# Patient Record
Sex: MALE | Race: WHITE | NOT HISPANIC OR LATINO
[De-identification: names, ages, dates, MRNs, and addresses within clinical notes are randomized per-mention and may not be internally consistent; named-entity substitution may affect disease eponyms.]

---

## 2017-03-28 ENCOUNTER — APPOINTMENT (OUTPATIENT)
Dept: DERMATOLOGY | Facility: CLINIC | Age: 57
End: 2017-03-28

## 2017-04-04 ENCOUNTER — APPOINTMENT (OUTPATIENT)
Dept: DERMATOLOGY | Facility: CLINIC | Age: 57
End: 2017-04-04

## 2018-04-03 ENCOUNTER — APPOINTMENT (OUTPATIENT)
Dept: CARDIOLOGY | Facility: CLINIC | Age: 58
End: 2018-04-03
Payer: MEDICARE

## 2018-04-03 ENCOUNTER — NON-APPOINTMENT (OUTPATIENT)
Age: 58
End: 2018-04-03

## 2018-04-03 VITALS
HEART RATE: 98 BPM | BODY MASS INDEX: 32.2 KG/M2 | SYSTOLIC BLOOD PRESSURE: 140 MMHG | WEIGHT: 230 LBS | HEIGHT: 71 IN | DIASTOLIC BLOOD PRESSURE: 64 MMHG

## 2018-04-03 DIAGNOSIS — Z00.00 ENCOUNTER FOR GENERAL ADULT MEDICAL EXAMINATION W/OUT ABNORMAL FINDINGS: ICD-10-CM

## 2018-04-03 DIAGNOSIS — M19.90 UNSPECIFIED OSTEOARTHRITIS, UNSPECIFIED SITE: ICD-10-CM

## 2018-04-03 PROCEDURE — 93000 ELECTROCARDIOGRAM COMPLETE: CPT

## 2018-04-03 PROCEDURE — 99205 OFFICE O/P NEW HI 60 MIN: CPT

## 2018-04-16 ENCOUNTER — APPOINTMENT (OUTPATIENT)
Dept: CARDIOLOGY | Facility: CLINIC | Age: 58
End: 2018-04-16
Payer: MEDICARE

## 2018-04-16 PROCEDURE — 93306 TTE W/DOPPLER COMPLETE: CPT

## 2018-04-19 ENCOUNTER — NON-APPOINTMENT (OUTPATIENT)
Age: 58
End: 2018-04-19

## 2018-04-19 ENCOUNTER — APPOINTMENT (OUTPATIENT)
Dept: CARDIOLOGY | Facility: CLINIC | Age: 58
End: 2018-04-19
Payer: MEDICARE

## 2018-04-19 VITALS — SYSTOLIC BLOOD PRESSURE: 128 MMHG | OXYGEN SATURATION: 98 % | HEART RATE: 74 BPM | DIASTOLIC BLOOD PRESSURE: 94 MMHG

## 2018-04-19 VITALS — DIASTOLIC BLOOD PRESSURE: 100 MMHG | SYSTOLIC BLOOD PRESSURE: 130 MMHG

## 2018-04-19 DIAGNOSIS — N20.0 CALCULUS OF KIDNEY: ICD-10-CM

## 2018-04-19 DIAGNOSIS — Z87.828 PERSONAL HISTORY OF OTHER (HEALED) PHYSICAL INJURY AND TRAUMA: ICD-10-CM

## 2018-04-19 DIAGNOSIS — Z78.9 OTHER SPECIFIED HEALTH STATUS: ICD-10-CM

## 2018-04-19 DIAGNOSIS — Z86.79 PERSONAL HISTORY OF OTHER DISEASES OF THE CIRCULATORY SYSTEM: ICD-10-CM

## 2018-04-19 DIAGNOSIS — Z56.0 UNEMPLOYMENT, UNSPECIFIED: ICD-10-CM

## 2018-04-19 PROCEDURE — 93000 ELECTROCARDIOGRAM COMPLETE: CPT

## 2018-04-19 PROCEDURE — 99214 OFFICE O/P EST MOD 30 MIN: CPT

## 2018-04-19 RX ORDER — METOPROLOL TARTRATE 25 MG/1
25 TABLET, FILM COATED ORAL TWICE DAILY
Refills: 0 | Status: DISCONTINUED | COMMUNITY
End: 2018-04-19

## 2018-04-19 SDOH — ECONOMIC STABILITY - INCOME SECURITY: UNEMPLOYMENT, UNSPECIFIED: Z56.0

## 2018-05-08 ENCOUNTER — OUTPATIENT (OUTPATIENT)
Dept: OUTPATIENT SERVICES | Facility: HOSPITAL | Age: 58
LOS: 1 days | End: 2018-05-08
Payer: MEDICARE

## 2018-05-08 VITALS
RESPIRATION RATE: 18 BRPM | OXYGEN SATURATION: 97 % | HEART RATE: 110 BPM | WEIGHT: 229.94 LBS | SYSTOLIC BLOOD PRESSURE: 127 MMHG | HEIGHT: 71 IN | DIASTOLIC BLOOD PRESSURE: 85 MMHG | TEMPERATURE: 98 F

## 2018-05-08 VITALS — WEIGHT: 229.94 LBS | HEIGHT: 71 IN

## 2018-05-08 DIAGNOSIS — Z90.49 ACQUIRED ABSENCE OF OTHER SPECIFIED PARTS OF DIGESTIVE TRACT: Chronic | ICD-10-CM

## 2018-05-08 DIAGNOSIS — S42.302A UNSPECIFIED FRACTURE OF SHAFT OF HUMERUS, LEFT ARM, INITIAL ENCOUNTER FOR CLOSED FRACTURE: Chronic | ICD-10-CM

## 2018-05-08 DIAGNOSIS — Z96.641 PRESENCE OF RIGHT ARTIFICIAL HIP JOINT: Chronic | ICD-10-CM

## 2018-05-08 DIAGNOSIS — Z95.828 PRESENCE OF OTHER VASCULAR IMPLANTS AND GRAFTS: Chronic | ICD-10-CM

## 2018-05-08 DIAGNOSIS — Z01.810 ENCOUNTER FOR PREPROCEDURAL CARDIOVASCULAR EXAMINATION: ICD-10-CM

## 2018-05-08 LAB
ANION GAP SERPL CALC-SCNC: 14 MMOL/L — SIGNIFICANT CHANGE UP (ref 5–17)
APTT BLD: 32.8 SEC — SIGNIFICANT CHANGE UP (ref 27.5–37.4)
BASOPHILS # BLD AUTO: 0 K/UL — SIGNIFICANT CHANGE UP (ref 0–0.2)
BASOPHILS NFR BLD AUTO: 0.6 % — SIGNIFICANT CHANGE UP (ref 0–2)
BUN SERPL-MCNC: 12 MG/DL — SIGNIFICANT CHANGE UP (ref 8–20)
CALCIUM SERPL-MCNC: 9.2 MG/DL — SIGNIFICANT CHANGE UP (ref 8.6–10.2)
CHLORIDE SERPL-SCNC: 100 MMOL/L — SIGNIFICANT CHANGE UP (ref 98–107)
CO2 SERPL-SCNC: 26 MMOL/L — SIGNIFICANT CHANGE UP (ref 22–29)
CREAT SERPL-MCNC: 0.69 MG/DL — SIGNIFICANT CHANGE UP (ref 0.5–1.3)
EOSINOPHIL # BLD AUTO: 0.1 K/UL — SIGNIFICANT CHANGE UP (ref 0–0.5)
EOSINOPHIL NFR BLD AUTO: 2.8 % — SIGNIFICANT CHANGE UP (ref 0–5)
GLUCOSE SERPL-MCNC: 88 MG/DL — SIGNIFICANT CHANGE UP (ref 70–115)
HCT VFR BLD CALC: 45.3 % — SIGNIFICANT CHANGE UP (ref 42–52)
HGB BLD-MCNC: 15.6 G/DL — SIGNIFICANT CHANGE UP (ref 14–18)
INR BLD: 1.18 RATIO — HIGH (ref 0.88–1.16)
LYMPHOCYTES # BLD AUTO: 1.1 K/UL — SIGNIFICANT CHANGE UP (ref 1–4.8)
LYMPHOCYTES # BLD AUTO: 31.7 % — SIGNIFICANT CHANGE UP (ref 20–55)
MCHC RBC-ENTMCNC: 33.6 PG — HIGH (ref 27–31)
MCHC RBC-ENTMCNC: 34.4 G/DL — SIGNIFICANT CHANGE UP (ref 32–36)
MCV RBC AUTO: 97.6 FL — HIGH (ref 80–94)
MONOCYTES # BLD AUTO: 0.4 K/UL — SIGNIFICANT CHANGE UP (ref 0–0.8)
MONOCYTES NFR BLD AUTO: 11.2 % — HIGH (ref 3–10)
NEUTROPHILS # BLD AUTO: 1.9 K/UL — SIGNIFICANT CHANGE UP (ref 1.8–8)
NEUTROPHILS NFR BLD AUTO: 53.7 % — SIGNIFICANT CHANGE UP (ref 37–73)
PLATELET # BLD AUTO: 156 K/UL — SIGNIFICANT CHANGE UP (ref 150–400)
POTASSIUM SERPL-MCNC: 4.2 MMOL/L — SIGNIFICANT CHANGE UP (ref 3.5–5.3)
POTASSIUM SERPL-SCNC: 4.2 MMOL/L — SIGNIFICANT CHANGE UP (ref 3.5–5.3)
PROTHROM AB SERPL-ACNC: 13 SEC — HIGH (ref 9.8–12.7)
RBC # BLD: 4.64 M/UL — SIGNIFICANT CHANGE UP (ref 4.6–6.2)
RBC # FLD: 12.8 % — SIGNIFICANT CHANGE UP (ref 11–15.6)
SODIUM SERPL-SCNC: 140 MMOL/L — SIGNIFICANT CHANGE UP (ref 135–145)
WBC # BLD: 3.6 K/UL — LOW (ref 4.8–10.8)
WBC # FLD AUTO: 3.6 K/UL — LOW (ref 4.8–10.8)

## 2018-05-08 PROCEDURE — 85027 COMPLETE CBC AUTOMATED: CPT

## 2018-05-08 PROCEDURE — 80048 BASIC METABOLIC PNL TOTAL CA: CPT

## 2018-05-08 PROCEDURE — 36415 COLL VENOUS BLD VENIPUNCTURE: CPT

## 2018-05-08 PROCEDURE — 93010 ELECTROCARDIOGRAM REPORT: CPT

## 2018-05-08 PROCEDURE — 85610 PROTHROMBIN TIME: CPT

## 2018-05-08 PROCEDURE — 85730 THROMBOPLASTIN TIME PARTIAL: CPT

## 2018-05-08 PROCEDURE — G0463: CPT

## 2018-05-08 PROCEDURE — 93005 ELECTROCARDIOGRAM TRACING: CPT

## 2018-05-08 NOTE — H&P PST ADULT - ASSESSMENT
This is a 59 yo white male with PMHx of Afib and was on coumadin but was resistant (as per patient) to coumadin and stopped taking it. Recent Echo revealed severe right atrial enlargement This is a 59 yo white male with PMHx of Afib and was on coumadin but was resistant (as per patient) to coumadin and stopped taking it. Recent Echo revealed severe right atrial enlargement and EF 30%  Plan is for cardiac cath to evaluate CAD etiology.
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
## 2018-05-08 NOTE — H&P PST ADULT - HISTORY OF PRESENT ILLNESS
This is a 57 yo white male with PMHx of Afib and was on coumadin but was resistant (as per patient) to coumadin and stopped taking it. Recent Echo revealed severe right atrial enlargement This is a 59 yo white male with PMHx of Afib and was on coumadin 15 years ago, but was resistant (as per patient) to coumadin and stopped taking it. Recent Echo revealed severe right atrial enlargement and EF 30%  Plan is for cardiac cath to evaluate CAD etiology.    4/16/18:  Thickened mitral valve, mild to moderate mitral regurgitation, thickened aortic valve, minimal aortic regurgitation, moderate to severe global LV dysfunction, severe right atrial enlargement, right ventricular enlargement with reduced mid  and distal right ventricular function, moderate tricuspid regurgitation, EF 30%
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
## 2018-05-08 NOTE — H&P PST ADULT - NEGATIVE NEUROLOGICAL SYMPTOMS
no focal seizures/no transient paralysis/no weakness/no paresthesias/no generalized seizures/no syncope
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
## 2018-05-08 NOTE — H&P PST ADULT - HEIGHT IN CM
From: Fritz Mayorga  To: Jenelle Espinoza MD  Sent: 5/11/2017 3:03 PM EDT  Subject: Test Results Question    Hello, I will be messaging doctor Laymond Severe in 3-4 weeks with lab results regarding my TSH but I was wondering if it was possible to have her order a HCV antibody test for me ASAP as I am currently with someone who had a exposure and is just now finding out she has tested positive for the antibody. My number is [phone] and you can message me back on here too. If possible, I would please just ask her to email the lab slap sheet if possible to my email: [email or web address] or to fax it to [phone] Monday through Friday 8-6pm. thanks - jun
[phone]

## 2018-05-09 PROBLEM — G47.30 SLEEP APNEA, UNSPECIFIED: Chronic | Status: ACTIVE | Noted: 2018-05-08

## 2018-05-10 ENCOUNTER — FORM ENCOUNTER (OUTPATIENT)
Age: 58
End: 2018-05-10

## 2018-05-10 ENCOUNTER — APPOINTMENT (OUTPATIENT)
Dept: CARDIOLOGY | Facility: CLINIC | Age: 58
End: 2018-05-10

## 2018-05-11 ENCOUNTER — TRANSCRIPTION ENCOUNTER (OUTPATIENT)
Age: 58
End: 2018-05-11

## 2018-05-11 ENCOUNTER — OUTPATIENT (OUTPATIENT)
Dept: OUTPATIENT SERVICES | Facility: HOSPITAL | Age: 58
LOS: 1 days | Discharge: HOME | End: 2018-05-11
Payer: MEDICARE

## 2018-05-11 VITALS
OXYGEN SATURATION: 100 % | RESPIRATION RATE: 14 BRPM | TEMPERATURE: 98 F | SYSTOLIC BLOOD PRESSURE: 123 MMHG | DIASTOLIC BLOOD PRESSURE: 78 MMHG | HEART RATE: 100 BPM

## 2018-05-11 VITALS
RESPIRATION RATE: 21 BRPM | OXYGEN SATURATION: 97 % | SYSTOLIC BLOOD PRESSURE: 116 MMHG | HEART RATE: 74 BPM | DIASTOLIC BLOOD PRESSURE: 72 MMHG

## 2018-05-11 DIAGNOSIS — Z96.641 PRESENCE OF RIGHT ARTIFICIAL HIP JOINT: Chronic | ICD-10-CM

## 2018-05-11 DIAGNOSIS — S42.302A UNSPECIFIED FRACTURE OF SHAFT OF HUMERUS, LEFT ARM, INITIAL ENCOUNTER FOR CLOSED FRACTURE: Chronic | ICD-10-CM

## 2018-05-11 DIAGNOSIS — Z95.828 PRESENCE OF OTHER VASCULAR IMPLANTS AND GRAFTS: Chronic | ICD-10-CM

## 2018-05-11 DIAGNOSIS — Z90.49 ACQUIRED ABSENCE OF OTHER SPECIFIED PARTS OF DIGESTIVE TRACT: Chronic | ICD-10-CM

## 2018-05-11 DIAGNOSIS — I48.91 UNSPECIFIED ATRIAL FIBRILLATION: ICD-10-CM

## 2018-05-11 PROCEDURE — 99152 MOD SED SAME PHYS/QHP 5/>YRS: CPT

## 2018-05-11 PROCEDURE — 93010 ELECTROCARDIOGRAM REPORT: CPT

## 2018-05-11 PROCEDURE — 93325 DOPPLER ECHO COLOR FLOW MAPG: CPT

## 2018-05-11 PROCEDURE — C1887: CPT

## 2018-05-11 PROCEDURE — 93312 ECHO TRANSESOPHAGEAL: CPT | Mod: 26

## 2018-05-11 PROCEDURE — 93325 DOPPLER ECHO COLOR FLOW MAPG: CPT | Mod: 26

## 2018-05-11 PROCEDURE — 93458 L HRT ARTERY/VENTRICLE ANGIO: CPT

## 2018-05-11 PROCEDURE — 93320 DOPPLER ECHO COMPLETE: CPT

## 2018-05-11 PROCEDURE — C1769: CPT

## 2018-05-11 PROCEDURE — 92960 CARDIOVERSION ELECTRIC EXT: CPT

## 2018-05-11 PROCEDURE — 93458 L HRT ARTERY/VENTRICLE ANGIO: CPT | Mod: 26

## 2018-05-11 PROCEDURE — 93005 ELECTROCARDIOGRAM TRACING: CPT

## 2018-05-11 PROCEDURE — 93320 DOPPLER ECHO COMPLETE: CPT | Mod: 26

## 2018-05-11 PROCEDURE — 93312 ECHO TRANSESOPHAGEAL: CPT

## 2018-05-11 PROCEDURE — C1894: CPT

## 2018-05-11 RX ORDER — CARVEDILOL PHOSPHATE 80 MG/1
1 CAPSULE, EXTENDED RELEASE ORAL
Qty: 180 | Refills: 0 | OUTPATIENT
Start: 2018-05-11

## 2018-05-11 RX ORDER — OMEGA-3 ACID ETHYL ESTERS 1 G
1 CAPSULE ORAL
Qty: 0 | Refills: 0 | COMMUNITY

## 2018-05-11 RX ORDER — ASPIRIN/CALCIUM CARB/MAGNESIUM 324 MG
325 TABLET ORAL DAILY
Qty: 0 | Refills: 0 | Status: DISCONTINUED | OUTPATIENT
Start: 2018-05-11 | End: 2018-05-26

## 2018-05-11 RX ORDER — VITAMIN E 100 UNIT
1 CAPSULE ORAL
Qty: 0 | Refills: 0 | COMMUNITY

## 2018-05-11 RX ORDER — CARVEDILOL PHOSPHATE 80 MG/1
1 CAPSULE, EXTENDED RELEASE ORAL
Qty: 0 | Refills: 0 | COMMUNITY

## 2018-05-11 RX ORDER — APIXABAN 2.5 MG/1
1 TABLET, FILM COATED ORAL
Qty: 180 | Refills: 0 | OUTPATIENT
Start: 2018-05-11

## 2018-05-11 RX ORDER — ENOXAPARIN SODIUM 100 MG/ML
100 INJECTION SUBCUTANEOUS ONCE
Qty: 0 | Refills: 0 | Status: COMPLETED | OUTPATIENT
Start: 2018-05-11 | End: 2018-05-11

## 2018-05-11 RX ORDER — APIXABAN 2.5 MG/1
5 TABLET, FILM COATED ORAL EVERY 12 HOURS
Qty: 0 | Refills: 0 | Status: DISCONTINUED | OUTPATIENT
Start: 2018-05-11 | End: 2018-05-26

## 2018-05-11 RX ADMIN — Medication 325 MILLIGRAM(S): at 10:02

## 2018-05-11 RX ADMIN — ENOXAPARIN SODIUM 100 MILLIGRAM(S): 100 INJECTION SUBCUTANEOUS at 11:40

## 2018-05-11 NOTE — DISCHARGE NOTE ADULT - CARE PROVIDER_API CALL
Marquise Presley), Cardiovascular Disease; Internal Medicine; Interventional Cardiology  [address]  [address]  [place] [address]  Phone: [phone]  Fax: [phone]

## 2018-05-11 NOTE — DISCHARGE NOTE ADULT - PATIENT PORTAL LINK FT
You can access the [facility] Patient Portal, offered by [facility], by registering with the following website: [email or web address]

## 2018-05-11 NOTE — DISCHARGE NOTE ADULT - PLAN OF CARE
Optimal cardiac fucnction No heavy lifting, driving, sex, tub baths, swimming, or any activity that submerges the lower half of the body in water for 48 hours.  Limited walking and stairs for 48 hours.    Observe the site frequently.  If bleeding or a large lump (the size of a golf ball or bigger) occurs lie flat, apply continuous direct pressure just above the puncture site for at least 10 minutes, and notify your physician immediately.  If the bleeding cannot be controlled, call 911 immediately for assistance.  Notify your physician of pain, swelling or any drainage.    Notify your physician immediately if coldness, numbness, discoloration or pain in your foot occurs.

## 2018-05-11 NOTE — DISCHARGE NOTE ADULT - MEDICATION SUMMARY - MEDICATIONS TO TAKE
I will START or STAY ON the medications listed below when I get home from the hospital:    apixaban 5 mg oral tablet  -- 1 tab(s) by mouth every 12 hours  -- Indication: For blood thinner    carvedilol 6.25 mg oral tablet  -- 1 tab(s) by mouth 2 times a day  -- Indication: For blood pressure    Fish Oil 1200 mg oral capsule  -- 1 cap(s) by mouth once a day  -- Indication: For vitamin    vitamin E 400 intl units oral capsule  -- 1 cap(s) by mouth once a day  -- Indication: For vitamin

## 2018-05-11 NOTE — DISCHARGE NOTE ADULT - NS AS ACTIVITY OBS
Walking-Outdoors allowed/Walking-Indoors allowed/Do not make important decisions/No Heavy lifting/straining/Showering allowed

## 2018-05-11 NOTE — DISCHARGE NOTE ADULT - CARE PLAN
Principal Discharge DX:	Atrial fibrillation Principal Discharge DX:	Atrial fibrillation  Goal:	Optimal cardiac fucnction  Assessment and plan of treatment:	No heavy lifting, driving, sex, tub baths, swimming, or any activity that submerges the lower half of the body in water for 48 hours.  Limited walking and stairs for 48 hours.    Observe the site frequently.  If bleeding or a large lump (the size of a golf ball or bigger) occurs lie flat, apply continuous direct pressure just above the puncture site for at least 10 minutes, and notify your physician immediately.  If the bleeding cannot be controlled, call 911 immediately for assistance.  Notify your physician of pain, swelling or any drainage.    Notify your physician immediately if coldness, numbness, discoloration or pain in your foot occurs.

## 2018-05-11 NOTE — DISCHARGE NOTE ADULT - HOSPITAL COURSE
59 yo white male with PMHx of Afib and was on coumadin 15 years ago, but was resistant (as per patient) to coumadin and stopped taking it. Recent Echo revealed severe right atrial enlargement and EF 30% Now s/p [facility] no intervention, s/p RAYNA and CV now in NSR. Right wrist site benign. Patient awake and alert without complaitns. Denies chest pain, sob, palps.      Neuro: A&OX3  Lungs: CTA B/L  CV: S1, S2, no murmur, RRR  Abd: Soft  Right Wrist no bleeding, no hematoma, no ecchymosis  Extremity: + distal pulses      A/P: 58y Male s/p C no intervention, and RAYNA and CV  1. Wrist management discussed with patient  2. Continue current meds  3. Follow up as an outpatient with cardiologist  4. Eliquis 5mg po BID first dose tonight  5. Discharge when gag reflex returns

## 2018-05-14 PROBLEM — I51.7 CARDIOMEGALY: Chronic | Status: ACTIVE | Noted: 2018-05-08

## 2018-05-14 PROBLEM — I45.10 UNSPECIFIED RIGHT BUNDLE-BRANCH BLOCK: Chronic | Status: ACTIVE | Noted: 2018-05-08

## 2018-05-14 PROBLEM — I51.9 HEART DISEASE, UNSPECIFIED: Chronic | Status: ACTIVE | Noted: 2018-05-08

## 2018-05-14 PROBLEM — I48.91 UNSPECIFIED ATRIAL FIBRILLATION: Chronic | Status: ACTIVE | Noted: 2018-05-08

## 2018-05-14 PROBLEM — M19.90 UNSPECIFIED OSTEOARTHRITIS, UNSPECIFIED SITE: Chronic | Status: ACTIVE | Noted: 2018-05-08

## 2018-05-24 ENCOUNTER — APPOINTMENT (OUTPATIENT)
Dept: CARDIOLOGY | Facility: CLINIC | Age: 58
End: 2018-05-24
Payer: MEDICARE

## 2018-05-24 ENCOUNTER — NON-APPOINTMENT (OUTPATIENT)
Age: 58
End: 2018-05-24

## 2018-05-24 VITALS — SYSTOLIC BLOOD PRESSURE: 122 MMHG | DIASTOLIC BLOOD PRESSURE: 82 MMHG | OXYGEN SATURATION: 97 % | HEART RATE: 91 BPM

## 2018-05-24 PROCEDURE — 93000 ELECTROCARDIOGRAM COMPLETE: CPT

## 2018-05-24 PROCEDURE — 99214 OFFICE O/P EST MOD 30 MIN: CPT

## 2018-05-24 RX ORDER — ASPIRIN 81 MG
81 TABLET, DELAYED RELEASE (ENTERIC COATED) ORAL DAILY
Refills: 0 | Status: DISCONTINUED | COMMUNITY
End: 2018-05-24

## 2018-06-25 ENCOUNTER — NON-APPOINTMENT (OUTPATIENT)
Age: 58
End: 2018-06-25

## 2018-06-25 ENCOUNTER — APPOINTMENT (OUTPATIENT)
Dept: ELECTROPHYSIOLOGY | Facility: CLINIC | Age: 58
End: 2018-06-25
Payer: MEDICARE

## 2018-06-25 VITALS
SYSTOLIC BLOOD PRESSURE: 133 MMHG | HEART RATE: 97 BPM | OXYGEN SATURATION: 97 % | DIASTOLIC BLOOD PRESSURE: 92 MMHG | HEIGHT: 71 IN

## 2018-06-25 DIAGNOSIS — L81.9 DISORDER OF PIGMENTATION, UNSPECIFIED: ICD-10-CM

## 2018-06-25 DIAGNOSIS — Z78.9 OTHER SPECIFIED HEALTH STATUS: ICD-10-CM

## 2018-06-25 DIAGNOSIS — G47.30 SLEEP APNEA, UNSPECIFIED: ICD-10-CM

## 2018-06-25 PROCEDURE — 93000 ELECTROCARDIOGRAM COMPLETE: CPT

## 2018-06-25 PROCEDURE — 99205 OFFICE O/P NEW HI 60 MIN: CPT | Mod: 25

## 2018-07-17 PROBLEM — T14.90XA INJURY, UNSPECIFIED, INITIAL ENCOUNTER: Chronic | Status: ACTIVE | Noted: 2018-05-08

## 2018-07-17 PROBLEM — K92.1 MELENA: Chronic | Status: ACTIVE | Noted: 2018-05-08

## 2018-08-02 ENCOUNTER — APPOINTMENT (OUTPATIENT)
Dept: CARDIOLOGY | Facility: CLINIC | Age: 58
End: 2018-08-02

## 2018-08-07 ENCOUNTER — APPOINTMENT (OUTPATIENT)
Dept: CARDIOLOGY | Facility: CLINIC | Age: 58
End: 2018-08-07
Payer: MEDICARE

## 2018-08-07 PROCEDURE — 93306 TTE W/DOPPLER COMPLETE: CPT

## 2018-08-13 PROBLEM — G47.30 SLEEP APNEA: Status: ACTIVE | Noted: 2018-04-03

## 2018-08-14 ENCOUNTER — RECORD ABSTRACTING (OUTPATIENT)
Age: 58
End: 2018-08-14

## 2018-08-20 ENCOUNTER — NON-APPOINTMENT (OUTPATIENT)
Age: 58
End: 2018-08-20

## 2018-08-20 ENCOUNTER — APPOINTMENT (OUTPATIENT)
Dept: ELECTROPHYSIOLOGY | Facility: CLINIC | Age: 58
End: 2018-08-20
Payer: MEDICARE

## 2018-08-20 VITALS
DIASTOLIC BLOOD PRESSURE: 80 MMHG | WEIGHT: 235 LBS | HEART RATE: 79 BPM | HEIGHT: 71 IN | BODY MASS INDEX: 32.9 KG/M2 | OXYGEN SATURATION: 98 % | SYSTOLIC BLOOD PRESSURE: 110 MMHG

## 2018-08-20 PROCEDURE — 93000 ELECTROCARDIOGRAM COMPLETE: CPT

## 2018-08-20 PROCEDURE — 99214 OFFICE O/P EST MOD 30 MIN: CPT | Mod: 25

## 2018-08-20 RX ORDER — AMIODARONE HYDROCHLORIDE 200 MG/1
200 TABLET ORAL DAILY
Qty: 30 | Refills: 5 | Status: DISCONTINUED | COMMUNITY
Start: 2018-05-24 | End: 2018-08-20

## 2018-09-24 ENCOUNTER — NON-APPOINTMENT (OUTPATIENT)
Age: 58
End: 2018-09-24

## 2018-09-24 ENCOUNTER — APPOINTMENT (OUTPATIENT)
Dept: ELECTROPHYSIOLOGY | Facility: CLINIC | Age: 58
End: 2018-09-24
Payer: MEDICARE

## 2018-09-24 VITALS
WEIGHT: 235 LBS | SYSTOLIC BLOOD PRESSURE: 115 MMHG | HEIGHT: 71 IN | HEART RATE: 91 BPM | BODY MASS INDEX: 32.9 KG/M2 | OXYGEN SATURATION: 96 % | DIASTOLIC BLOOD PRESSURE: 79 MMHG

## 2018-09-24 PROCEDURE — 93000 ELECTROCARDIOGRAM COMPLETE: CPT

## 2018-09-24 PROCEDURE — 99215 OFFICE O/P EST HI 40 MIN: CPT | Mod: 25

## 2018-10-11 ENCOUNTER — APPOINTMENT (OUTPATIENT)
Dept: CARDIOLOGY | Facility: CLINIC | Age: 58
End: 2018-10-11
Payer: MEDICARE

## 2018-10-11 VITALS
WEIGHT: 235 LBS | BODY MASS INDEX: 32.9 KG/M2 | SYSTOLIC BLOOD PRESSURE: 115 MMHG | HEART RATE: 87 BPM | OXYGEN SATURATION: 97 % | DIASTOLIC BLOOD PRESSURE: 77 MMHG | HEIGHT: 71 IN

## 2018-10-11 DIAGNOSIS — Z78.9 OTHER SPECIFIED HEALTH STATUS: ICD-10-CM

## 2018-10-11 PROCEDURE — 99214 OFFICE O/P EST MOD 30 MIN: CPT

## 2018-10-30 ENCOUNTER — APPOINTMENT (OUTPATIENT)
Dept: CARDIOLOGY | Facility: CLINIC | Age: 58
End: 2018-10-30

## 2019-01-30 ENCOUNTER — APPOINTMENT (OUTPATIENT)
Dept: ELECTROPHYSIOLOGY | Facility: CLINIC | Age: 59
End: 2019-01-30
Payer: MEDICARE

## 2019-01-30 ENCOUNTER — MEDICATION RENEWAL (OUTPATIENT)
Age: 59
End: 2019-01-30

## 2019-01-30 VITALS
SYSTOLIC BLOOD PRESSURE: 125 MMHG | OXYGEN SATURATION: 96 % | HEART RATE: 106 BPM | HEIGHT: 71 IN | DIASTOLIC BLOOD PRESSURE: 84 MMHG | WEIGHT: 235 LBS | BODY MASS INDEX: 32.9 KG/M2

## 2019-01-30 PROCEDURE — 93000 ELECTROCARDIOGRAM COMPLETE: CPT

## 2019-01-30 PROCEDURE — 99215 OFFICE O/P EST HI 40 MIN: CPT | Mod: 25

## 2019-01-30 NOTE — PHYSICAL EXAM
[General Appearance - Well Developed] : well developed [No Deformities] : no deformities [General Appearance - In No Acute Distress] : no acute distress [Normal Conjunctiva] : the conjunctiva exhibited no abnormalities [No Oral Pallor] : no oral pallor [Normal Jugular Venous V Waves Present] : normal jugular venous V waves present [Respiration, Rhythm And Depth] : normal respiratory rhythm and effort [Auscultation Breath Sounds / Voice Sounds] : lungs were clear to auscultation bilaterally [Abdomen Tenderness] : non-tender [] : no hepato-splenomegaly [Abnormal Walk] : normal gait [Nail Clubbing] : no clubbing of the fingernails [Cyanosis, Localized] : no localized cyanosis [No Venous Stasis] : no venous stasis [Impaired Insight] : insight and judgment were intact [5th Left ICS - MCL] : palpated at the 5th LICS in the midclavicular line [Normal Rate] : normal [Irregularly Irregular] : irregularly irregular [II] : a grade 2 [2+] : left 2+ [1+] : left 1+ [Nonpitting Edema] : nonpitting edema present

## 2019-02-11 ENCOUNTER — RX RENEWAL (OUTPATIENT)
Age: 59
End: 2019-02-11

## 2019-02-14 ENCOUNTER — APPOINTMENT (OUTPATIENT)
Dept: CARDIOLOGY | Facility: CLINIC | Age: 59
End: 2019-02-14

## 2019-02-24 ENCOUNTER — NON-APPOINTMENT (OUTPATIENT)
Age: 59
End: 2019-02-24

## 2019-02-24 NOTE — REASON FOR VISIT
[Atrial Fibrillation] : atrial fibrillation [Cardiomyopathy] : cardiomyopathy [FreeTextEntry2] : follow up
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
## 2019-02-24 NOTE — REVIEW OF SYSTEMS
[see HPI] : see HPI [Joint Pain] : joint pain [Negative] : ENT [Recent Weight Gain (___ Lbs)] : no recent weight gain [Feeling Fatigued] : not feeling fatigued [Cough] : no cough [Abdominal Pain] : no abdominal pain [Urinary Frequency] : no change in urinary frequency [Skin: A Rash] : no rash: [Dizziness] : no dizziness [Confusion] : no confusion was observed [Anxiety] : no anxiety [Excessive Thirst] : no polydipsia [Easy Bleeding] : no tendency for easy bleeding [Easy Bruising] : no tendency for easy bruising

## 2019-02-24 NOTE — DISCUSSION/SUMMARY
[FreeTextEntry1] : Patient has Chronic persistent AF and a cardiomyopathy. He has no Heart failure symptom.\par He does not amiodarone or catheter ablation. He thinks medicine makes him worse and doesn’t want to take. I have explained to his he is at risk for cardioembolic phen as a result of the AF and needs to take meds because of CM. Risk of worsening CM and sudden death explained. He is resistant\par Patient may need pacing/biventricular/ICD. Echo from 2018 had shown EF 30%. I reexplained to the patient the need to take meds - increasing dose of coreg and ACE. Followup Holter to reassess rate control. Recommend followup with cardiologist and myself within month to rediscuss. He will rethink whether he wants to take the medicines.

## 2019-06-17 NOTE — H&P PST ADULT - NEGATIVE CARDIOVASCULAR SYMPTOMS
Patient not taking calls at this time . 06/17/19   no peripheral edema/no chest pain/no palpitations/no claudication/no orthopnea/no paroxysmal nocturnal dyspnea/no dyspnea on exertion

## 2019-06-19 ENCOUNTER — APPOINTMENT (OUTPATIENT)
Dept: CARDIOLOGY | Facility: CLINIC | Age: 59
End: 2019-06-19
Payer: MEDICARE

## 2019-06-19 ENCOUNTER — NON-APPOINTMENT (OUTPATIENT)
Age: 59
End: 2019-06-19

## 2019-06-19 VITALS
SYSTOLIC BLOOD PRESSURE: 140 MMHG | WEIGHT: 235 LBS | DIASTOLIC BLOOD PRESSURE: 80 MMHG | HEIGHT: 71 IN | BODY MASS INDEX: 32.9 KG/M2 | OXYGEN SATURATION: 99 %

## 2019-06-19 PROCEDURE — 99215 OFFICE O/P EST HI 40 MIN: CPT

## 2019-06-19 PROCEDURE — 93000 ELECTROCARDIOGRAM COMPLETE: CPT

## 2019-06-19 RX ORDER — RAMIPRIL 2.5 MG/1
2.5 CAPSULE ORAL TWICE DAILY
Qty: 180 | Refills: 1 | Status: DISCONTINUED | COMMUNITY
Start: 2018-08-20 | End: 2019-06-19

## 2019-06-19 RX ORDER — APIXABAN 5 MG/1
5 TABLET, FILM COATED ORAL
Qty: 180 | Refills: 1 | Status: DISCONTINUED | COMMUNITY
Start: 2018-05-24 | End: 2019-06-19

## 2019-06-19 NOTE — CARDIOLOGY SUMMARY
[LVEF ___%] : LVEF [unfilled]% [___] : [unfilled] [Severe] : severe LV dysfunction [Enlarged] : enlarged LA size [Normal] : normal [None] : no mitral regurgitation

## 2019-06-21 NOTE — REVIEW OF SYSTEMS
[Feeling Fatigued] : feeling fatigued [Negative] : Heme/Lymph [Shortness Of Breath] : no shortness of breath [Dyspnea on exertion] : not dyspnea during exertion [Chest Pain] : no chest pain [Lower Ext Edema] : no extremity edema [Palpitations] : no palpitations [Dizziness] : no dizziness

## 2019-06-21 NOTE — DISCUSSION/SUMMARY
[Patient] : the patient [Benefits] : benefits [Risks] : risks [Alternatives] : alternatives [___ Week(s)] : [unfilled] week(s) [With Me] : with me [FreeTextEntry1] : This is a 59 year old male with sleep apnea and obesity here with persistent atrial fibrillation , and newly diagnosed cardiomyopathy , \par \par 1. AF:   discussed with patient > 30 mins about risk of stroke and risk of cardiomyopathy and sudden cardiac death. Patient states he wants to try other therapies. His wife is a nurse. \par we agreed to increase coreg for now. patient will think about anticoagulation and cardioversion/ablation.\par recommended ACE-I / ARB. patient defers it. Not on aldactone.  NYHA class I. \par

## 2019-06-21 NOTE — HISTORY OF PRESENT ILLNESS
[FreeTextEntry1] : persistent atrial fibrillation .\par feels good. exercise. no chest pain. no dyspnea. \par patient had a long hisotry. he has drop in his EF due to afib. failed cardioversion.  patient does not feel anything bad. he is ok. no chest pain. no dyspnea. no headaches. \par no dizziness. \par failed cardioversion. may 2018  .   he continues to speak about his history. \par \par \par old note: Patient with history of Afib and was on AC with coumadin. He has been attempted on Digoxin. Unclear if he has paroxysmal or permanent Afib.\par Was Planned for colonoscopy due to abnormal margins/polyp 1 year prior and was seen by cardiology in [place]. Underwent TTE and note to have severe CM. Advised to undergo cardiac cath but patient appears to be questioning that decision and is seeking a second opinion. \par 5/24/2018\par Patient here for f/u s/p cardiac cath-no significant disease and RAYNA/DCCV- converted to SR. Then recurrent afib. \par \par 10/2018- Stable. No chest pain.

## 2019-06-21 NOTE — REASON FOR VISIT
[Follow-Up - Clinic] : a clinic follow-up of [FreeTextEntry2] : atrial fibrillation  [FreeTextEntry1] : atrial fibrillation

## 2019-06-21 NOTE — PHYSICAL EXAM
[General Appearance - Well Developed] : well developed [Normal Oral Mucosa] : normal oral mucosa [Normal Conjunctiva] : the conjunctiva exhibited no abnormalities [Normal Jugular Venous V Waves Present] : normal jugular venous V waves present [Respiration, Rhythm And Depth] : normal respiratory rhythm and effort [] : no respiratory distress [Bowel Sounds] : normal bowel sounds [Abnormal Walk] : normal gait [Oriented To Time, Place, And Person] : oriented to person, place, and time [Nail Clubbing] : no clubbing of the fingernails [Skin Color & Pigmentation] : normal skin color and pigmentation [FreeTextEntry1] : irregular, tachycardia

## 2019-07-10 ENCOUNTER — APPOINTMENT (OUTPATIENT)
Dept: CARDIOLOGY | Facility: CLINIC | Age: 59
End: 2019-07-10
Payer: MEDICARE

## 2019-07-10 PROCEDURE — 93306 TTE W/DOPPLER COMPLETE: CPT

## 2019-07-10 PROCEDURE — 76376 3D RENDER W/INTRP POSTPROCES: CPT

## 2019-07-17 ENCOUNTER — APPOINTMENT (OUTPATIENT)
Dept: CARDIOLOGY | Facility: CLINIC | Age: 59
End: 2019-07-17
Payer: MEDICARE

## 2019-07-17 VITALS
WEIGHT: 235 LBS | HEART RATE: 102 BPM | BODY MASS INDEX: 32.9 KG/M2 | DIASTOLIC BLOOD PRESSURE: 81 MMHG | SYSTOLIC BLOOD PRESSURE: 127 MMHG | OXYGEN SATURATION: 96 % | HEIGHT: 71 IN

## 2019-07-17 PROCEDURE — 99214 OFFICE O/P EST MOD 30 MIN: CPT

## 2019-07-17 PROCEDURE — 93000 ELECTROCARDIOGRAM COMPLETE: CPT

## 2019-07-22 ENCOUNTER — NON-APPOINTMENT (OUTPATIENT)
Age: 59
End: 2019-07-22

## 2019-08-13 ENCOUNTER — MEDICATION RENEWAL (OUTPATIENT)
Age: 59
End: 2019-08-13

## 2019-08-26 ENCOUNTER — RX RENEWAL (OUTPATIENT)
Age: 59
End: 2019-08-26

## 2019-11-11 ENCOUNTER — RX ONLY (RX ONLY)
Age: 59
End: 2019-11-11

## 2019-11-11 ENCOUNTER — OFFICE (OUTPATIENT)
Dept: URBAN - METROPOLITAN AREA CLINIC 100 | Facility: CLINIC | Age: 59
Setting detail: OPHTHALMOLOGY
End: 2019-11-11
Payer: COMMERCIAL

## 2019-11-11 DIAGNOSIS — H01.002: ICD-10-CM

## 2019-11-11 DIAGNOSIS — H00.12: ICD-10-CM

## 2019-11-11 DIAGNOSIS — H05.20: ICD-10-CM

## 2019-11-11 DIAGNOSIS — H01.005: ICD-10-CM

## 2019-11-11 PROCEDURE — 92285 EXTERNAL OCULAR PHOTOGRAPHY: CPT | Performed by: OPHTHALMOLOGY

## 2019-11-11 PROCEDURE — 92002 INTRM OPH EXAM NEW PATIENT: CPT | Performed by: OPHTHALMOLOGY

## 2019-11-11 ASSESSMENT — LID POSITION - DERMATOCHALASIS
OS_DERMATOCHALASIS: LUL
OD_DERMATOCHALASIS: RUL

## 2019-11-11 ASSESSMENT — VISUAL ACUITY
OS_BCVA: 20/20+2
OD_BCVA: 20/20-1

## 2019-11-11 ASSESSMENT — LID EXAM ASSESSMENTS
OD_BLEPHARITIS: 1+
OS_BLEPHARITIS: 1+

## 2019-11-11 ASSESSMENT — CONFRONTATIONAL VISUAL FIELD TEST (CVF)
OD_FINDINGS: FULL
OS_FINDINGS: FULL

## 2019-12-30 NOTE — H&P PST ADULT - DOES THIS PATIENT HAVE A HISTORY OF OR HAS BEEN DX WITH HEART FAILURE?
12/31/19      Re:   [facility] Pa[facility]  [address]  [place] [address]           Pre-Cardiac Catheterization Instructions  This if for further information regarding possible future surgery of aortic valve.    Appointment Date:  Friday January 10th, 2020 with Dr. Murray Bianchi    Registration/ Arrival Time:  0600  To Cardiac Services on 2nd floor    (Note:  This is 1.5 hours prior to procedure)    ·  You will need a ride home after the procedure    ·  Expected length of time in Cardiac Service is 7 1/2 to 8 hours    ·  Do not eat or drink after:  Midnight the night prior    Please bring the following:  · Overnight bag, for possible overnight stay if intervention is done  · All of your current medications in their original bottles  · Copy of advanced directives, if not already on file    Medication review:  · Diuretic Medication:  furosemide DO NOT TAKE THE DAY OF PROCEDURE  · Potassium Medication:  potassium DO NOT TAKE THE DAY OF PROCEDURE  · Eliquis, do not take 2 days prior to your procedure (last dose evening of 1/7/2020).  · Ok to take your other medications with a sip of water the morning of your procedure.      If you have any questions, please do not hesitate to call my office Dept: [phone]      Sincerely,    The Office of Dr. Mala JIMENEZ [facility]  [address] [place] [address] [phone]   no

## 2020-04-22 ENCOUNTER — APPOINTMENT (OUTPATIENT)
Dept: CARDIOLOGY | Facility: CLINIC | Age: 60
End: 2020-04-22

## 2020-05-20 ENCOUNTER — APPOINTMENT (OUTPATIENT)
Dept: CARDIOLOGY | Facility: CLINIC | Age: 60
End: 2020-05-20
Payer: MEDICARE

## 2020-05-20 ENCOUNTER — NON-APPOINTMENT (OUTPATIENT)
Age: 60
End: 2020-05-20

## 2020-05-20 VITALS
SYSTOLIC BLOOD PRESSURE: 110 MMHG | WEIGHT: 240 LBS | HEART RATE: 112 BPM | DIASTOLIC BLOOD PRESSURE: 76 MMHG | OXYGEN SATURATION: 97 % | RESPIRATION RATE: 16 BRPM | BODY MASS INDEX: 33.6 KG/M2 | HEIGHT: 71 IN | TEMPERATURE: 98.2 F

## 2020-05-20 PROCEDURE — 93000 ELECTROCARDIOGRAM COMPLETE: CPT

## 2020-05-20 PROCEDURE — 99215 OFFICE O/P EST HI 40 MIN: CPT

## 2020-05-20 RX ORDER — CARVEDILOL 6.25 MG/1
6.25 TABLET, FILM COATED ORAL
Qty: 180 | Refills: 1 | Status: DISCONTINUED | COMMUNITY
Start: 2019-08-26 | End: 2020-05-20

## 2020-05-20 NOTE — CARDIOLOGY SUMMARY
[___] : [unfilled] [LVEF ___%] : LVEF [unfilled]% [Severe] : severe LV dysfunction [Enlarged] : enlarged LA size [None] : no mitral regurgitation [Normal] : normal

## 2020-05-21 ENCOUNTER — RX ONLY (RX ONLY)
Age: 60
End: 2020-05-21

## 2020-05-21 ENCOUNTER — OFFICE (OUTPATIENT)
Dept: URBAN - METROPOLITAN AREA CLINIC 12 | Facility: CLINIC | Age: 60
Setting detail: OPHTHALMOLOGY
End: 2020-05-21
Payer: COMMERCIAL

## 2020-05-21 DIAGNOSIS — H04.123: ICD-10-CM

## 2020-05-21 DIAGNOSIS — H01.001: ICD-10-CM

## 2020-05-21 DIAGNOSIS — H01.004: ICD-10-CM

## 2020-05-21 DIAGNOSIS — H10.9: ICD-10-CM

## 2020-05-21 PROCEDURE — 92012 INTRM OPH EXAM EST PATIENT: CPT | Performed by: OPHTHALMOLOGY

## 2020-05-21 ASSESSMENT — AXIALLENGTH_DERIVED
OD_AL: 23.9621
OS_AL: 23.92

## 2020-05-21 ASSESSMENT — VISUAL ACUITY
OD_BCVA: 20/25-1
OS_BCVA: 20/25-2

## 2020-05-21 ASSESSMENT — REFRACTION_AUTOREFRACTION
OS_SPHERE: +0.75
OS_AXIS: 075
OD_CYLINDER: -0.25
OS_CYLINDER: -1.25
OD_SPHERE: +0.25
OD_AXIS: 152

## 2020-05-21 ASSESSMENT — KERATOMETRY
OS_K1POWER_DIOPTERS: 42.25
OS_K2POWER_DIOPTERS: 42.74
OD_K1POWER_DIOPTERS: 42.25
OS_AXISANGLE_DEGREES: 125
OD_AXISANGLE_DEGREES: 078
OD_K2POWER_DIOPTERS: 42.50

## 2020-05-21 ASSESSMENT — LID POSITION - DERMATOCHALASIS
OS_DERMATOCHALASIS: LUL
OD_DERMATOCHALASIS: RUL

## 2020-05-21 ASSESSMENT — SPHEQUIV_DERIVED
OD_SPHEQUIV: 0.125
OS_SPHEQUIV: 0.125

## 2020-05-21 ASSESSMENT — LID EXAM ASSESSMENTS
OS_BLEPHARITIS: LUL 1+
OD_BLEPHARITIS: RUL 1+

## 2020-05-22 ENCOUNTER — APPOINTMENT (OUTPATIENT)
Dept: CARDIOLOGY | Facility: CLINIC | Age: 60
End: 2020-05-22
Payer: MEDICARE

## 2020-05-22 PROCEDURE — 93306 TTE W/DOPPLER COMPLETE: CPT

## 2020-05-29 ENCOUNTER — OFFICE (OUTPATIENT)
Dept: URBAN - METROPOLITAN AREA CLINIC 12 | Facility: CLINIC | Age: 60
Setting detail: OPHTHALMOLOGY
End: 2020-05-29
Payer: COMMERCIAL

## 2020-05-29 DIAGNOSIS — H11.31: ICD-10-CM

## 2020-05-29 DIAGNOSIS — H01.001: ICD-10-CM

## 2020-05-29 DIAGNOSIS — H01.004: ICD-10-CM

## 2020-05-29 DIAGNOSIS — H04.123: ICD-10-CM

## 2020-05-29 PROBLEM — H10.9 CONJUNCTIVITIS, UNSPECIFIED ; BOTH EYES: Status: RESOLVED | Noted: 2020-05-21 | Resolved: 2020-05-29

## 2020-05-29 PROCEDURE — 92014 COMPRE OPH EXAM EST PT 1/>: CPT | Performed by: OPHTHALMOLOGY

## 2020-05-29 PROCEDURE — 83861 MICROFLUID ANALY TEARS: CPT | Performed by: OPHTHALMOLOGY

## 2020-05-29 ASSESSMENT — KERATOMETRY
OS_K1POWER_DIOPTERS: 42.25
OS_K2POWER_DIOPTERS: 42.75
OD_K1POWER_DIOPTERS: 42.25
OS_AXISANGLE_DEGREES: 153
OD_K2POWER_DIOPTERS: 42.50
OD_AXISANGLE_DEGREES: 088

## 2020-05-29 ASSESSMENT — CONFRONTATIONAL VISUAL FIELD TEST (CVF)
OS_FINDINGS: FULL
OD_FINDINGS: FULL

## 2020-05-29 ASSESSMENT — LID POSITION - DERMATOCHALASIS
OS_DERMATOCHALASIS: LUL
OD_DERMATOCHALASIS: RUL

## 2020-05-29 ASSESSMENT — AXIALLENGTH_DERIVED: OD_AL: 23.9621

## 2020-05-29 ASSESSMENT — REFRACTION_AUTOREFRACTION
OD_AXIS: 173
OS_CYLINDER: -1.25
OD_SPHERE: +0.25
OS_AXIS: 073
OD_CYLINDER: -0.25

## 2020-05-29 ASSESSMENT — VISUAL ACUITY
OS_BCVA: 20/20
OD_BCVA: 20/25+2

## 2020-05-29 ASSESSMENT — TONOMETRY
OS_IOP_MMHG: 18
OD_IOP_MMHG: 18

## 2020-05-29 ASSESSMENT — SPHEQUIV_DERIVED: OD_SPHEQUIV: 0.125

## 2020-05-29 ASSESSMENT — LID EXAM ASSESSMENTS
OD_BLEPHARITIS: RUL 1+
OS_BLEPHARITIS: LUL 1+

## 2020-06-03 ENCOUNTER — APPOINTMENT (OUTPATIENT)
Dept: CARDIOLOGY | Facility: CLINIC | Age: 60
End: 2020-06-03
Payer: MEDICARE

## 2020-06-03 PROCEDURE — 93351 STRESS TTE COMPLETE: CPT

## 2020-06-03 PROCEDURE — 93352 ADMIN ECG CONTRAST AGENT: CPT

## 2020-06-03 PROCEDURE — 93320 DOPPLER ECHO COMPLETE: CPT

## 2020-06-17 NOTE — PHYSICAL EXAM
[General Appearance - Well Developed] : well developed [Normal Conjunctiva] : the conjunctiva exhibited no abnormalities [Normal Jugular Venous V Waves Present] : normal jugular venous V waves present [Normal Oral Mucosa] : normal oral mucosa [] : no respiratory distress [Respiration, Rhythm And Depth] : normal respiratory rhythm and effort [Bowel Sounds] : normal bowel sounds [Abnormal Walk] : normal gait [Nail Clubbing] : no clubbing of the fingernails [Skin Color & Pigmentation] : normal skin color and pigmentation [Oriented To Time, Place, And Person] : oriented to person, place, and time [FreeTextEntry1] : irregular, tachycardia

## 2020-06-17 NOTE — DISCUSSION/SUMMARY
[Patient] : the patient [Benefits] : benefits [Risks] : risks [Alternatives] : alternatives [___ Month(s)] : [unfilled] month(s) [With Me] : with me [Additional Diagnostics Recommended] : additional diagnostics recommended [FreeTextEntry1] : This is a 59 year old male with sleep apnea and obesity here with persistent atrial fibrillation , and newly diagnosed cardiomyopathy , \par \par 1) Pre-operative cardiovascular risk evaluation and management : History of cardiomyopathy ,. last stress test 2 years ago.  will order nuclear stress test . 2D echo.  \par optimized heart failure. \par 2 AF: : ct coreg 12.5 Q12.  refused anticoagulation . controlled. \par 3) cardiomyopathy : non-ischemic cardiomyopathy, systolic heart failure LVEF 38%   refused ARB . on beta blocker repeat echo .  does not want rhythm control strategy for atrial fibrillation . \par lipid profile , lipoprotein, \par

## 2020-06-17 NOTE — REVIEW OF SYSTEMS
[Feeling Fatigued] : feeling fatigued [Negative] : Heme/Lymph [Chest Pain] : no chest pain [Shortness Of Breath] : no shortness of breath [Dyspnea on exertion] : not dyspnea during exertion [Palpitations] : no palpitations [Lower Ext Edema] : no extremity edema [Dizziness] : no dizziness

## 2020-06-17 NOTE — HISTORY OF PRESENT ILLNESS
[Preoperative Visit] : for a medical evaluation prior to surgery [Scheduled Procedure ___] : a [unfilled] [Date of Surgery ___] : on [unfilled] [Good] : Good [Cardiovascular Disease] : cardiovascular disease [Prior Anesthesia] : Prior anesthesia [Echocardiogram] : ~T an echocardiogram ~C was performed [Electrocardiogram] : ~T an ECG ~C was performed [Metabolic Capacity ___Mets%] : The patient has a metabolic capacity of [unfilled] Mets%  [FreeTextEntry1] : persistent atrial fibrillation .\par HPI for today: L complains of abdominal; pain and groimn pain.  he was having a chronic cough.  and developed hernia.  scheduled for surgery.\par no chest pain. noheadaches. no dizziness. no syncope. no chest pain \par no shortness of breathe \par compliant with meds. \par here for Pre-operative cardiovascular risk evaluation and management for hernia surgery \par \par \par old note: feels good. exercise. no chest pain. no dyspnea. \par patient had a long hisotry. he has drop in his EF due to afib. failed cardioversion.  patient does not feel anything bad. he is ok. no chest pain. no dyspnea. no headaches. \par no dizziness. \par failed cardioversion. may 2018  .   he continues to speak about his history. \par \par \par old note: Patient with history of Afib and was on AC with coumadin. He has been attempted on Digoxin. Unclear if he has paroxysmal or permanent Afib.\par Was Planned for colonoscopy due to abnormal margins/polyp 1 year prior and was seen by cardiology in [place]. Underwent TTE and note to have severe CM. Advised to undergo cardiac cath but patient appears to be questioning that decision and is seeking a second opinion. \par 5/24/2018\par Patient here for f/u s/p cardiac cath-no significant disease and RAYNA/DCCV- converted to SR. Then recurrent afib. \par   [Fever] : no fever [Chills] : no chills [Fatigue] : no fatigue [Cough] : no cough [Chest Pain] : no chest pain [Dysuria] : no dysuria [Dyspnea] : no dyspnea [Nausea] : no nausea [Urinary Frequency] : no urinary frequency [Diarrhea] : no diarrhea [Abdominal Pain] : no abdominal pain [Vomiting] : no vomiting [Poor Exercise Tolerance] : no poor exercise tolerance [Easy Bruising] : no easy bruising [Lower Extremity Swelling] : no lower extremity swelling [Diabetes] : no diabetes [Pulmonary Disease] : no pulmonary disease [Alcohol Use] : no  alcohol use [Renal Disease] : no renal disease [Anti-Platelet Agents] : no anti-platelet agents [Nicotine Dependence] : no nicotine dependence [GI Disease] : no gastrointestinal disease [Sleep Apnea] : no sleep apnea [Frequent use of NSAIDs] : no use of NSAIDs [Clotting Disorder] : no clotting disorder [Thromboembolic Problems] : no thromboembolic problems [Bleeding Disorder] : no bleeding disorder [Impaired Immunity] : no impaired immunity [Transfusion Reaction] : no transfusion reaction [Steroid Use in Last 6 Months] : no steroid use in the last six months [Frequent Aspirin Use] : no frequent aspirin use [Prev Anesthesia Reaction] : no previous anesthesia reaction

## 2020-06-17 NOTE — ADDENDUM
[FreeTextEntry1] : 6/17/2020:" Sterss echo is non ischemic study. LVEF 35-40%,. Stable CHF symptoms. NYHA class I-II\par no further cardic work up is needed. proceed for procedure as indicated. any further work up will not change the risk of procedure. beneftts outweigh the risk.\par

## 2020-11-06 ENCOUNTER — APPOINTMENT (OUTPATIENT)
Dept: COLORECTAL SURGERY | Facility: CLINIC | Age: 60
End: 2020-11-06
Payer: MEDICARE

## 2020-11-06 VITALS
HEIGHT: 71 IN | RESPIRATION RATE: 16 BRPM | SYSTOLIC BLOOD PRESSURE: 137 MMHG | DIASTOLIC BLOOD PRESSURE: 86 MMHG | WEIGHT: 245 LBS | TEMPERATURE: 97.7 F | HEART RATE: 103 BPM | BODY MASS INDEX: 34.3 KG/M2

## 2020-11-06 DIAGNOSIS — K62.5 HEMORRHAGE OF ANUS AND RECTUM: ICD-10-CM

## 2020-11-06 DIAGNOSIS — R19.4 CHANGE IN BOWEL HABIT: ICD-10-CM

## 2020-11-06 DIAGNOSIS — C18.9 MALIGNANT NEOPLASM OF COLON, UNSPECIFIED: ICD-10-CM

## 2020-11-06 PROCEDURE — 99204 OFFICE O/P NEW MOD 45 MIN: CPT

## 2020-11-06 PROCEDURE — 99072 ADDL SUPL MATRL&STAF TM PHE: CPT

## 2020-11-06 NOTE — CONSULT LETTER
[Dear  ___] : Dear  [unfilled], [Consult Letter:] : I had the pleasure of evaluating your patient, [unfilled]. [Please see my note below.] : Please see my note below. [Consult Closing:] : Thank you very much for allowing me to participate in the care of this patient.  If you have any questions, please do not hesitate to contact me. [Sincerely,] : Sincerely, [FreeTextEntry3] : Alex Sandhu M.D. FACS, FASCRS

## 2020-11-06 NOTE — ASSESSMENT
[FreeTextEntry1] : 60-year-old male with previous history of colon cancer and polyps with recent changes in bowel habits and bleeding. Recommend colonoscopy. Risks and benefits of colonoscopy have been discussed which include but not limited to bleeding, perforation, missing a cancer or polyp occurring 5%.\par Recommend high fiber diet, Metamucil daily, sitz baths, stool softeners, pain medications p.r.n.

## 2020-11-06 NOTE — PHYSICAL EXAM
[Abdomen Masses] : No abdominal masses [Abdomen Tenderness] : ~T No ~M abdominal tenderness [Tender] : nontender [JVD] : no jugular venous distention  [Normal Breath Sounds] : Normal breath sounds [Normal Heart Sounds] : normal heart sounds [Normal Rate and Rhythm] : normal rate and rhythm [No Rash or Lesion] : No rash or lesion [Alert] : alert [Oriented to Person] : oriented to person [Oriented to Place] : oriented to place [Oriented to Time] : oriented to time [Calm] : calm [de-identified] : Obese [de-identified] : Looks well in no distress, of stated age. [de-identified] : pupils equal reactive to light normocephalic atraumatic. [de-identified] : moves all 4 extremities appropriately with 5 over 5 strength

## 2020-11-06 NOTE — REVIEW OF SYSTEMS
[As Noted in HPI] : as noted in HPI [Constipation] : constipation [Diarrhea] : diarrhea [Negative] : Heme/Lymph [FreeTextEntry7] : Bleeding

## 2020-11-06 NOTE — HISTORY OF PRESENT ILLNESS
[FreeTextEntry1] : Consultation requested by Dr. sanjiv concepcion for previous history of colon cancer and changes in bowel habits and occasional bleeding. 60-year-old male with previous history of cancerous polyp with recent changes in bowel habits and occasional bleeding.

## 2020-11-10 ENCOUNTER — NON-APPOINTMENT (OUTPATIENT)
Age: 60
End: 2020-11-10

## 2020-11-10 ENCOUNTER — APPOINTMENT (OUTPATIENT)
Dept: CARDIOLOGY | Facility: CLINIC | Age: 60
End: 2020-11-10
Payer: MEDICARE

## 2020-11-10 VITALS
TEMPERATURE: 97.8 F | WEIGHT: 245 LBS | BODY MASS INDEX: 34.3 KG/M2 | DIASTOLIC BLOOD PRESSURE: 84 MMHG | OXYGEN SATURATION: 98 % | HEART RATE: 99 BPM | HEIGHT: 71 IN | SYSTOLIC BLOOD PRESSURE: 136 MMHG

## 2020-11-10 PROCEDURE — 93000 ELECTROCARDIOGRAM COMPLETE: CPT | Mod: NC

## 2020-11-10 PROCEDURE — 99215 OFFICE O/P EST HI 40 MIN: CPT

## 2020-11-10 PROCEDURE — 99072 ADDL SUPL MATRL&STAF TM PHE: CPT

## 2020-11-10 NOTE — PHYSICAL EXAM
[General Appearance - Well Developed] : well developed [Normal Conjunctiva] : the conjunctiva exhibited no abnormalities [Normal Oral Mucosa] : normal oral mucosa [Normal Jugular Venous V Waves Present] : normal jugular venous V waves present [] : no respiratory distress [Respiration, Rhythm And Depth] : normal respiratory rhythm and effort [FreeTextEntry1] : irregular, tachycardia [Bowel Sounds] : normal bowel sounds [Abnormal Walk] : normal gait [Nail Clubbing] : no clubbing of the fingernails [Skin Color & Pigmentation] : normal skin color and pigmentation [Oriented To Time, Place, And Person] : oriented to person, place, and time

## 2020-11-10 NOTE — HISTORY OF PRESENT ILLNESS
[Preoperative Visit] : for a medical evaluation prior to surgery [Scheduled Procedure ___] : a [unfilled] [Date of Surgery ___] : on [unfilled] [Surgeon Name ___] : surgeon: [unfilled] [Fever] : no fever [Chills] : no chills [Fatigue] : no fatigue [Chest Pain] : no chest pain [Cough] : no cough [Dyspnea] : no dyspnea [Dysuria] : no dysuria [Urinary Frequency] : no urinary frequency [Nausea] : no nausea [Vomiting] : no vomiting [Diarrhea] : no diarrhea [Abdominal Pain] : no abdominal pain [Easy Bruising] : no easy bruising [Lower Extremity Swelling] : no lower extremity swelling [Poor Exercise Tolerance] : no poor exercise tolerance [Diabetes] : no diabetes [Cardiovascular Disease] : cardiovascular disease [Pulmonary Disease] : no pulmonary disease [Anti-Platelet Agents] : no anti-platelet agents [Nicotine Dependence] : no nicotine dependence [Alcohol Use] : no  alcohol use [Renal Disease] : no renal disease [GI Disease] : no gastrointestinal disease [Sleep Apnea] : no sleep apnea [Thromboembolic Problems] : no thromboembolic problems [Frequent use of NSAIDs] : no use of NSAIDs [Impaired Immunity] : no impaired immunity [Steroid Use in Last 6 Months] : no steroid use in the last six months [Frequent Aspirin Use] : no frequent aspirin use [Prior Anesthesia] : Prior anesthesia [Prev Anesthesia Reaction] : no previous anesthesia reaction [Electrocardiogram] : ~T an ECG ~C was performed [Echocardiogram] : ~T an echocardiogram ~C was performed [Metabolic Capacity ___Mets%] : The patient has a metabolic capacity of [unfilled] Mets%  [Good] : Good [Anesthesia Reaction] : no anesthesia reaction [Sudden Death] : no sudden death [Clotting Disorder] : no clotting disorder [Bleeding Disorder] : no bleeding disorder [de-identified] : ECLI; shola giacomo [de-identified] : he can walk: >15 mins. No dyspnea. no chest pain  [FreeTextEntry1] : persistent atrial fibrillation .\par \par HPI for today: : Pre-operative cardiovascular risk evaluation and management : he is here for colonoscopy.  no chest pain and no shortness of breathe . no dizziness. \par \par old note:  L complains of abdominal; pain and groimn pain.  he was having a chronic cough.  and developed hernia.  scheduled for surgery.\par no chest pain. noheadaches. no dizziness. no syncope. no chest pain \par no shortness of breathe \par compliant with meds. \par here for Pre-operative cardiovascular risk evaluation and management for hernia surgery \par \par \par old note: feels good. exercise. no chest pain. no dyspnea. \par patient had a long hisotry. he has drop in his EF due to afib. failed cardioversion.  patient does not feel anything bad. he is ok. no chest pain. no dyspnea. no headaches. \par no dizziness. \par failed cardioversion. may 2018  .   he continues to speak about his history. \par \par \par old note: Patient with history of Afib and was on AC with coumadin. He has been attempted on Digoxin. Unclear if he has paroxysmal or permanent Afib.\par Was Planned for colonoscopy due to abnormal margins/polyp 1 year prior and was seen by cardiology in [place]. Underwent TTE and note to have severe CM. Advised to undergo cardiac cath but patient appears to be questioning that decision and is seeking a second opinion. \par 5/24/2018\par Patient here for f/u s/p cardiac cath-no significant disease and RAYNA/DCCV- converted to SR. Then recurrent afib. \par

## 2020-11-10 NOTE — DISCUSSION/SUMMARY
[Optimized for Surgery] : the patient is optimized for surgery [As per surgery] : as per surgery [Continue] : Continue medications as currently directed [FreeTextEntry1] : This is a 59 year old male with sleep apnea and obesity here with persistent atrial fibrillation , and newly diagnosed cardiomyopathy , \par \par 1) Pre-operative cardiovascular risk evaluation and management : History of cardiomyopathy ,.no ishemic ECG chagnes. no cardiac symptoms. \par optimized heart failure.  no further cardiac work up is needed. proceed for procedure as indicated. \par 2 AF: : ct coreg 12.5 Q12.  refused anticoagulation . controlled. \par 3) cardiomyopathy : non-ischemic cardiomyopathy, systolic heart failure LVEF 38%   refused ARB . on beta blocker .  does not want rhythm control strategy for atrial fibrillation . \par 6 mths followup. \par  \par   [Patient] : the patient [Risks] : risks [Benefits] : benefits [Alternatives] : alternatives [___ Month(s)] : [unfilled] month(s) [With Me] : with me

## 2020-11-10 NOTE — REVIEW OF SYSTEMS
[Feeling Fatigued] : feeling fatigued [Shortness Of Breath] : no shortness of breath [Dyspnea on exertion] : not dyspnea during exertion [Chest Pain] : no chest pain [Lower Ext Edema] : no extremity edema [Palpitations] : no palpitations [Dizziness] : no dizziness [Negative] : Heme/Lymph

## 2020-11-25 DIAGNOSIS — Z01.818 ENCOUNTER FOR OTHER PREPROCEDURAL EXAMINATION: ICD-10-CM

## 2020-11-28 ENCOUNTER — APPOINTMENT (OUTPATIENT)
Dept: DISASTER EMERGENCY | Facility: CLINIC | Age: 60
End: 2020-11-28

## 2020-11-29 LAB — SARS-COV-2 N GENE NPH QL NAA+PROBE: NOT DETECTED

## 2020-12-01 ENCOUNTER — APPOINTMENT (OUTPATIENT)
Dept: COLORECTAL SURGERY | Facility: CLINIC | Age: 60
End: 2020-12-01
Payer: MEDICARE

## 2020-12-01 PROCEDURE — 45385 COLONOSCOPY W/LESION REMOVAL: CPT

## 2021-02-03 ENCOUNTER — OFFICE (OUTPATIENT)
Dept: URBAN - METROPOLITAN AREA CLINIC 12 | Facility: CLINIC | Age: 61
Setting detail: OPHTHALMOLOGY
End: 2021-02-03
Payer: COMMERCIAL

## 2021-02-03 DIAGNOSIS — H10.89: ICD-10-CM

## 2021-02-03 DIAGNOSIS — H04.123: ICD-10-CM

## 2021-02-03 DIAGNOSIS — H01.004: ICD-10-CM

## 2021-02-03 DIAGNOSIS — H01.001: ICD-10-CM

## 2021-02-03 PROBLEM — H11.31 SUB-CONJUNCTIVAL HEMORRHAGE; RIGHT EYE: Status: RESOLVED | Noted: 2020-05-29 | Resolved: 2021-02-03

## 2021-02-03 PROCEDURE — 92012 INTRM OPH EXAM EST PATIENT: CPT | Performed by: OPHTHALMOLOGY

## 2021-02-03 ASSESSMENT — TONOMETRY
OS_IOP_MMHG: 19
OD_IOP_MMHG: 19

## 2021-02-03 ASSESSMENT — LID POSITION - DERMATOCHALASIS
OD_DERMATOCHALASIS: RUL
OS_DERMATOCHALASIS: LUL

## 2021-02-03 ASSESSMENT — VISUAL ACUITY
OD_BCVA: 20/25
OS_BCVA: 20/20

## 2021-02-03 ASSESSMENT — SUPERFICIAL PUNCTATE KERATITIS (SPK)
OS_SPK: T
OD_SPK: T

## 2021-02-03 ASSESSMENT — AXIALLENGTH_DERIVED
OD_AL: 23.9621
OS_AL: 23.8678

## 2021-02-03 ASSESSMENT — KERATOMETRY
OS_AXISANGLE_DEGREES: 156
OD_AXISANGLE_DEGREES: 076
OD_K2POWER_DIOPTERS: 42.50
OS_K1POWER_DIOPTERS: 42.25
OD_K1POWER_DIOPTERS: 42.25
OS_K2POWER_DIOPTERS: 43.00

## 2021-02-03 ASSESSMENT — CONFRONTATIONAL VISUAL FIELD TEST (CVF)
OD_FINDINGS: FULL
OS_FINDINGS: FULL

## 2021-02-03 ASSESSMENT — REFRACTION_AUTOREFRACTION
OD_SPHERE: +0.25
OS_AXIS: 078
OS_SPHERE: +0.75
OS_CYLINDER: -1.25
OD_CYLINDER: -0.25
OD_AXIS: 147

## 2021-02-03 ASSESSMENT — SPHEQUIV_DERIVED
OD_SPHEQUIV: 0.125
OS_SPHEQUIV: 0.125

## 2021-02-03 ASSESSMENT — LID EXAM ASSESSMENTS
OS_BLEPHARITIS: LUL 1+
OD_BLEPHARITIS: RUL 1+

## 2021-02-11 ENCOUNTER — OFFICE (OUTPATIENT)
Dept: URBAN - METROPOLITAN AREA CLINIC 12 | Facility: CLINIC | Age: 61
Setting detail: OPHTHALMOLOGY
End: 2021-02-11
Payer: COMMERCIAL

## 2021-02-11 ENCOUNTER — RX ONLY (RX ONLY)
Age: 61
End: 2021-02-11

## 2021-02-11 DIAGNOSIS — H01.004: ICD-10-CM

## 2021-02-11 DIAGNOSIS — H10.89: ICD-10-CM

## 2021-02-11 DIAGNOSIS — H01.001: ICD-10-CM

## 2021-02-11 DIAGNOSIS — H04.123: ICD-10-CM

## 2021-02-11 PROCEDURE — 99213 OFFICE O/P EST LOW 20 MIN: CPT | Performed by: OPHTHALMOLOGY

## 2021-02-11 ASSESSMENT — LID POSITION - DERMATOCHALASIS
OD_DERMATOCHALASIS: RUL
OS_DERMATOCHALASIS: LUL

## 2021-02-11 ASSESSMENT — VISUAL ACUITY
OD_BCVA: 20/25
OS_BCVA: 20/20

## 2021-02-11 ASSESSMENT — REFRACTION_AUTOREFRACTION
OS_SPHERE: +0.75
OS_AXIS: 080
OD_AXIS: 145
OD_CYLINDER: -0.25
OD_SPHERE: +0.25
OS_CYLINDER: -1.50

## 2021-02-11 ASSESSMENT — SUPERFICIAL PUNCTATE KERATITIS (SPK)
OD_SPK: T
OS_SPK: T

## 2021-02-11 ASSESSMENT — KERATOMETRY
OD_K1POWER_DIOPTERS: 42.25
OD_AXISANGLE_DEGREES: 081
OS_K2POWER_DIOPTERS: 43.00
OD_K2POWER_DIOPTERS: 42.50
OS_K1POWER_DIOPTERS: 42.25
OS_AXISANGLE_DEGREES: 161

## 2021-02-11 ASSESSMENT — SPHEQUIV_DERIVED
OD_SPHEQUIV: 0.125
OS_SPHEQUIV: 0

## 2021-02-11 ASSESSMENT — CONFRONTATIONAL VISUAL FIELD TEST (CVF)
OS_FINDINGS: FULL
OD_FINDINGS: FULL

## 2021-02-11 ASSESSMENT — LID EXAM ASSESSMENTS
OD_BLEPHARITIS: RUL 1+
OS_BLEPHARITIS: LUL 1+

## 2021-02-11 ASSESSMENT — AXIALLENGTH_DERIVED
OD_AL: 23.9621
OS_AL: 23.9177

## 2021-02-11 ASSESSMENT — TONOMETRY
OS_IOP_MMHG: 20
OD_IOP_MMHG: 15

## 2021-05-18 ENCOUNTER — APPOINTMENT (OUTPATIENT)
Dept: OTOLARYNGOLOGY | Facility: CLINIC | Age: 61
End: 2021-05-18

## 2021-05-26 ENCOUNTER — RX ONLY (RX ONLY)
Age: 61
End: 2021-05-26

## 2021-05-26 ENCOUNTER — OFFICE (OUTPATIENT)
Dept: URBAN - METROPOLITAN AREA CLINIC 12 | Facility: CLINIC | Age: 61
Setting detail: OPHTHALMOLOGY
End: 2021-05-26
Payer: COMMERCIAL

## 2021-05-26 DIAGNOSIS — H02.831: ICD-10-CM

## 2021-05-26 DIAGNOSIS — H16.223: ICD-10-CM

## 2021-05-26 DIAGNOSIS — H01.004: ICD-10-CM

## 2021-05-26 DIAGNOSIS — H01.001: ICD-10-CM

## 2021-05-26 DIAGNOSIS — H10.45: ICD-10-CM

## 2021-05-26 DIAGNOSIS — H02.834: ICD-10-CM

## 2021-05-26 PROBLEM — H05.20 EXOPHTHALMOS UNSPECIFIED ; RIGHT EYE: Status: ACTIVE | Noted: 2019-11-11

## 2021-05-26 PROCEDURE — 99213 OFFICE O/P EST LOW 20 MIN: CPT | Performed by: OPHTHALMOLOGY

## 2021-05-26 ASSESSMENT — KERATOMETRY
OD_AXISANGLE_DEGREES: 071
OD_K1POWER_DIOPTERS: 42.25
OS_K2POWER_DIOPTERS: 43.00
OS_K1POWER_DIOPTERS: 42.25
OD_K2POWER_DIOPTERS: 42.50
OS_AXISANGLE_DEGREES: 151

## 2021-05-26 ASSESSMENT — REFRACTION_AUTOREFRACTION
OD_SPHERE: +0.25
OD_AXIS: 137
OS_AXIS: 074
OD_CYLINDER: -0.25
OS_SPHERE: +0.25
OS_CYLINDER: -1.00

## 2021-05-26 ASSESSMENT — VISUAL ACUITY
OD_BCVA: 20/25
OS_BCVA: 20/20-1

## 2021-05-26 ASSESSMENT — TONOMETRY
OS_IOP_MMHG: 18
OD_IOP_MMHG: 19

## 2021-05-26 ASSESSMENT — AXIALLENGTH_DERIVED
OS_AL: 24.0183
OD_AL: 23.9621

## 2021-05-26 ASSESSMENT — LID EXAM ASSESSMENTS
OD_BLEPHARITIS: RUL 1+
OS_BLEPHARITIS: LUL 1+

## 2021-05-26 ASSESSMENT — LID POSITION - DERMATOCHALASIS
OS_DERMATOCHALASIS: LUL
OD_DERMATOCHALASIS: RUL

## 2021-05-26 ASSESSMENT — CONFRONTATIONAL VISUAL FIELD TEST (CVF)
OD_FINDINGS: FULL
OS_FINDINGS: FULL

## 2021-05-26 ASSESSMENT — SPHEQUIV_DERIVED
OS_SPHEQUIV: -0.25
OD_SPHEQUIV: 0.125

## 2021-05-26 ASSESSMENT — SUPERFICIAL PUNCTATE KERATITIS (SPK)
OD_SPK: T
OS_SPK: T

## 2021-09-15 ENCOUNTER — APPOINTMENT (OUTPATIENT)
Dept: CARDIOLOGY | Facility: CLINIC | Age: 61
End: 2021-09-15
Payer: MEDICARE

## 2021-09-15 ENCOUNTER — NON-APPOINTMENT (OUTPATIENT)
Age: 61
End: 2021-09-15

## 2021-09-15 VITALS
SYSTOLIC BLOOD PRESSURE: 131 MMHG | WEIGHT: 245 LBS | OXYGEN SATURATION: 93 % | DIASTOLIC BLOOD PRESSURE: 81 MMHG | BODY MASS INDEX: 34.3 KG/M2 | HEART RATE: 105 BPM | HEIGHT: 71 IN | TEMPERATURE: 98.4 F

## 2021-09-15 DIAGNOSIS — R53.83 OTHER FATIGUE: ICD-10-CM

## 2021-09-15 PROCEDURE — 93000 ELECTROCARDIOGRAM COMPLETE: CPT

## 2021-09-15 PROCEDURE — 99215 OFFICE O/P EST HI 40 MIN: CPT

## 2021-09-15 NOTE — HISTORY OF PRESENT ILLNESS
[FreeTextEntry1] : persistent atrial fibrillation .\par \par \par HPI for today: : no chest pain . no dyspnea on exertion : no nausea no vomiting, \par no headhaces. no dizziness. no palptaitons.\par he feels like 43 years. he is compliatn with meds\par \par \par old note:  Pre-operative cardiovascular risk evaluation and management : he is here for colonoscopy.  no chest pain and no shortness of breathe . no dizziness. \par \par old note:  L complains of abdominal; pain and groimn pain.  he was having a chronic cough.  and developed hernia.  scheduled for surgery.\par no chest pain. noheadaches. no dizziness. no syncope. no chest pain \par no shortness of breathe \par compliant with meds. \par here for Pre-operative cardiovascular risk evaluation and management for hernia surgery \par \par \par old note: feels good. exercise. no chest pain. no dyspnea. \par patient had a long hisotry. he has drop in his EF due to afib. failed cardioversion.  patient does not feel anything bad. he is ok. no chest pain. no dyspnea. no headaches. \par no dizziness. \par failed cardioversion. may 2018  .   he continues to speak about his history. \par \par \par old note: Patient with history of Afib and was on AC with coumadin. He has been attempted on Digoxin. Unclear if he has paroxysmal or permanent Afib.\par Was Planned for colonoscopy due to abnormal margins/polyp 1 year prior and was seen by cardiology in [place]. Underwent TTE and note to have severe CM. Advised to undergo cardiac cath but patient appears to be questioning that decision and is seeking a second opinion. \par 5/24/2018\par Patient here for f/u s/p cardiac cath-no significant disease and RAYNA/DCCV- converted to SR. Then recurrent afib. \par

## 2021-09-15 NOTE — CARDIOLOGY SUMMARY
[de-identified] : 9 15 2021: Atrial flutter-fibrillation   bpm.\par -Right bundle branch block with left axis -bifascicular block. \par \par ABNORMAL \par  [___] : [unfilled] [LVEF ___%] : LVEF [unfilled]% [Severe] : severe LV dysfunction [Enlarged] : enlarged LA size [None] : no mitral regurgitation [Normal] : normal

## 2021-09-15 NOTE — DISCUSSION/SUMMARY
[Patient] : the patient [Risks] : risks [Benefits] : benefits [Alternatives] : alternatives [With Me] : with me [___ Month(s)] : in [unfilled] month(s) [FreeTextEntry1] : This is a 59 year old male with sleep apnea and obesity here with persistent atrial fibrillation , and newly diagnosed cardiomyopathy , \par \par 1) AF: : ct coreg 12.5 Q12.  refused anticoagulation . controlled. \par 3) cardiomyopathy : non-ischemic cardiomyopathy, systolic heart failure LVEF 38%   refused ARB . on beta blocker .  does not want rhythm control strategy for atrial fibrillation .  do not want to increase beta blocker .  does not want to repeat echo now. he defers it.  he has been refusing diagnostic and recommendation in the past.  3D Echo and strain. \par we discussed. entresto and  discussed mediations again. sharath wants to do his own research.\par \par  \par

## 2022-05-11 ENCOUNTER — APPOINTMENT (OUTPATIENT)
Dept: CARDIOLOGY | Facility: CLINIC | Age: 62
End: 2022-05-11

## 2022-09-26 ENCOUNTER — RX RENEWAL (OUTPATIENT)
Age: 62
End: 2022-09-26

## 2022-10-19 ENCOUNTER — RESULT REVIEW (OUTPATIENT)
Age: 62
End: 2022-10-19

## 2023-01-10 ENCOUNTER — APPOINTMENT (OUTPATIENT)
Dept: CARDIOLOGY | Facility: CLINIC | Age: 63
End: 2023-01-10
Payer: MEDICARE

## 2023-01-10 VITALS
WEIGHT: 240 LBS | BODY MASS INDEX: 33.6 KG/M2 | OXYGEN SATURATION: 96 % | SYSTOLIC BLOOD PRESSURE: 129 MMHG | DIASTOLIC BLOOD PRESSURE: 86 MMHG | TEMPERATURE: 98.3 F | HEART RATE: 109 BPM | HEIGHT: 71 IN

## 2023-01-10 DIAGNOSIS — I45.10 UNSPECIFIED RIGHT BUNDLE-BRANCH BLOCK: ICD-10-CM

## 2023-01-10 PROCEDURE — 99215 OFFICE O/P EST HI 40 MIN: CPT | Mod: 25

## 2023-01-10 PROCEDURE — 93000 ELECTROCARDIOGRAM COMPLETE: CPT

## 2023-02-06 ENCOUNTER — OFFICE (OUTPATIENT)
Dept: URBAN - METROPOLITAN AREA CLINIC 12 | Facility: CLINIC | Age: 63
Setting detail: OPHTHALMOLOGY
End: 2023-02-06
Payer: MEDICARE

## 2023-02-06 ENCOUNTER — RX ONLY (RX ONLY)
Age: 63
End: 2023-02-06

## 2023-02-06 DIAGNOSIS — H10.45: ICD-10-CM

## 2023-02-06 DIAGNOSIS — H16.223: ICD-10-CM

## 2023-02-06 DIAGNOSIS — H01.001: ICD-10-CM

## 2023-02-06 DIAGNOSIS — H02.831: ICD-10-CM

## 2023-02-06 DIAGNOSIS — H01.004: ICD-10-CM

## 2023-02-06 DIAGNOSIS — H02.834: ICD-10-CM

## 2023-02-06 PROCEDURE — 99213 OFFICE O/P EST LOW 20 MIN: CPT | Performed by: OPTOMETRIST

## 2023-02-06 ASSESSMENT — REFRACTION_AUTOREFRACTION
OS_CYLINDER: -1.25
OS_AXIS: 080
OS_SPHERE: +0.75
OD_SPHERE: +0.75
OD_AXIS: 133
OD_CYLINDER: -0.25

## 2023-02-06 ASSESSMENT — CONFRONTATIONAL VISUAL FIELD TEST (CVF)
OD_FINDINGS: FULL
OS_FINDINGS: FULL

## 2023-02-06 ASSESSMENT — SPHEQUIV_DERIVED
OD_SPHEQUIV: 0.625
OS_SPHEQUIV: 0.125

## 2023-02-06 ASSESSMENT — KERATOMETRY
OS_K1POWER_DIOPTERS: 43.00
OD_K2POWER_DIOPTERS: 42.50
OD_AXISANGLE_DEGREES: 067
OS_AXISANGLE_DEGREES: 161
OD_K1POWER_DIOPTERS: 42.25
OS_K2POWER_DIOPTERS: 43.00

## 2023-02-06 ASSESSMENT — LID POSITION - DERMATOCHALASIS
OD_DERMATOCHALASIS: RUL
OS_DERMATOCHALASIS: LUL

## 2023-02-06 ASSESSMENT — TONOMETRY: OD_IOP_MMHG: 18

## 2023-02-06 ASSESSMENT — LID EXAM ASSESSMENTS
OD_BLEPHARITIS: RUL 1+
OS_BLEPHARITIS: LUL 1+

## 2023-02-06 ASSESSMENT — AXIALLENGTH_DERIVED
OS_AL: 23.7276
OD_AL: 23.7629

## 2023-02-06 ASSESSMENT — VISUAL ACUITY
OS_BCVA: 20/25-1
OD_BCVA: 20/25-2

## 2023-02-06 ASSESSMENT — SUPERFICIAL PUNCTATE KERATITIS (SPK)
OD_SPK: T
OS_SPK: T

## 2023-02-07 ENCOUNTER — OFFICE (OUTPATIENT)
Dept: URBAN - METROPOLITAN AREA CLINIC 12 | Facility: CLINIC | Age: 63
Setting detail: OPHTHALMOLOGY
End: 2023-02-07
Payer: MEDICARE

## 2023-02-07 DIAGNOSIS — H02.831: ICD-10-CM

## 2023-02-07 DIAGNOSIS — H16.223: ICD-10-CM

## 2023-02-07 DIAGNOSIS — H01.004: ICD-10-CM

## 2023-02-07 DIAGNOSIS — H10.45: ICD-10-CM

## 2023-02-07 DIAGNOSIS — H02.834: ICD-10-CM

## 2023-02-07 DIAGNOSIS — H01.001: ICD-10-CM

## 2023-02-07 PROCEDURE — 92014 COMPRE OPH EXAM EST PT 1/>: CPT | Performed by: OPHTHALMOLOGY

## 2023-02-07 ASSESSMENT — LID EXAM ASSESSMENTS
OD_BLEPHARITIS: RUL 1+
OS_BLEPHARITIS: LUL 1+

## 2023-02-07 ASSESSMENT — SUPERFICIAL PUNCTATE KERATITIS (SPK)
OD_SPK: T
OS_SPK: T

## 2023-02-07 ASSESSMENT — AXIALLENGTH_DERIVED
OS_AL: 23.912
OD_AL: 23.8564
OD_AL: 23.8564
OS_AL: 23.912

## 2023-02-07 ASSESSMENT — SPHEQUIV_DERIVED
OS_SPHEQUIV: 0.25
OS_SPHEQUIV: 0.25
OD_SPHEQUIV: 0.625
OD_SPHEQUIV: 0.625

## 2023-02-07 ASSESSMENT — REFRACTION_MANIFEST
OS_AXIS: 080
OD_AXIS: 125
OD_CYLINDER: -0.25
OS_VA1: 20/20-2
OD_VA1: 20/20
OD_SPHERE: +0.75
OS_CYLINDER: -1.50
OS_SPHERE: +1.00

## 2023-02-07 ASSESSMENT — REFRACTION_AUTOREFRACTION
OD_CYLINDER: -0.25
OS_CYLINDER: -1.50
OD_AXIS: 127
OS_SPHERE: +1.00
OS_AXIS: 079
OD_SPHERE: +0.75

## 2023-02-07 ASSESSMENT — LID POSITION - DERMATOCHALASIS
OD_DERMATOCHALASIS: RUL
OS_DERMATOCHALASIS: LUL

## 2023-02-07 ASSESSMENT — KERATOMETRY
OS_K2POWER_DIOPTERS: 42.75
OS_K1POWER_DIOPTERS: 42.00
OS_AXISANGLE_DEGREES: 156
OD_K1POWER_DIOPTERS: 42.00
OD_K2POWER_DIOPTERS: 42.25
OD_AXISANGLE_DEGREES: 060

## 2023-02-07 ASSESSMENT — VISUAL ACUITY
OS_BCVA: 20/20-2
OD_BCVA: 20/30+2

## 2023-02-07 ASSESSMENT — TONOMETRY
OD_IOP_MMHG: 18
OS_IOP_MMHG: 20

## 2023-02-07 ASSESSMENT — CONFRONTATIONAL VISUAL FIELD TEST (CVF)
OS_FINDINGS: FULL
OD_FINDINGS: FULL

## 2023-04-04 ENCOUNTER — APPOINTMENT (OUTPATIENT)
Dept: COLORECTAL SURGERY | Facility: CLINIC | Age: 63
End: 2023-04-04
Payer: MEDICARE

## 2023-04-04 ENCOUNTER — OFFICE (OUTPATIENT)
Dept: URBAN - METROPOLITAN AREA CLINIC 12 | Facility: CLINIC | Age: 63
Setting detail: OPHTHALMOLOGY
End: 2023-04-04
Payer: MEDICARE

## 2023-04-04 VITALS — RESPIRATION RATE: 16 BRPM | HEIGHT: 71 IN | WEIGHT: 250 LBS | BODY MASS INDEX: 35 KG/M2

## 2023-04-04 DIAGNOSIS — H00.14: ICD-10-CM

## 2023-04-04 DIAGNOSIS — Z85.038 PERSONAL HISTORY OF OTHER MALIGNANT NEOPLASM OF LARGE INTESTINE: ICD-10-CM

## 2023-04-04 PROCEDURE — 99214 OFFICE O/P EST MOD 30 MIN: CPT

## 2023-04-04 PROCEDURE — 92012 INTRM OPH EXAM EST PATIENT: CPT | Performed by: OPTOMETRIST

## 2023-04-04 ASSESSMENT — VISUAL ACUITY
OD_BCVA: 20/30+2
OS_BCVA: 20/25+1

## 2023-04-04 ASSESSMENT — KERATOMETRY
OS_K2POWER_DIOPTERS: 42.75
OD_AXISANGLE_DEGREES: 060
OD_K2POWER_DIOPTERS: 42.25
OS_AXISANGLE_DEGREES: 156
OD_K1POWER_DIOPTERS: 42.00
OS_K1POWER_DIOPTERS: 42.00

## 2023-04-04 ASSESSMENT — SUPERFICIAL PUNCTATE KERATITIS (SPK)
OD_SPK: T
OS_SPK: T

## 2023-04-04 ASSESSMENT — SPHEQUIV_DERIVED
OS_SPHEQUIV: 0.25
OD_SPHEQUIV: 0.625
OD_SPHEQUIV: 0.625
OS_SPHEQUIV: 0.25

## 2023-04-04 ASSESSMENT — CONFRONTATIONAL VISUAL FIELD TEST (CVF)
OD_FINDINGS: FULL
OS_FINDINGS: FULL

## 2023-04-04 ASSESSMENT — AXIALLENGTH_DERIVED
OD_AL: 23.8564
OS_AL: 23.912
OS_AL: 23.912
OD_AL: 23.8564

## 2023-04-04 ASSESSMENT — REFRACTION_MANIFEST
OD_SPHERE: +0.75
OD_AXIS: 125
OD_VA1: 20/20
OS_SPHERE: +1.00
OS_AXIS: 080
OS_CYLINDER: -1.50
OD_CYLINDER: -0.25
OS_VA1: 20/20-2

## 2023-04-04 ASSESSMENT — TONOMETRY
OS_IOP_MMHG: 19
OD_IOP_MMHG: 18

## 2023-04-04 ASSESSMENT — REFRACTION_AUTOREFRACTION
OS_AXIS: 077
OS_SPHERE: +1.00
OD_CYLINDER: -0.25
OS_CYLINDER: -1.50
OD_SPHERE: +0.75
OD_AXIS: 131

## 2023-04-04 ASSESSMENT — LID EXAM ASSESSMENTS
OS_BLEPHARITIS: LUL 1+
OD_BLEPHARITIS: RUL 1+

## 2023-04-04 ASSESSMENT — LID POSITION - DERMATOCHALASIS
OS_DERMATOCHALASIS: LUL
OD_DERMATOCHALASIS: RUL

## 2023-04-04 NOTE — HISTORY OF PRESENT ILLNESS
[FreeTextEntry1] : This63-year-old male with previous history of colon cancer changes in bowel habits.

## 2023-04-04 NOTE — PHYSICAL EXAM
[Abdomen Masses] : No abdominal masses [Abdomen Tenderness] : ~T No ~M abdominal tenderness [Tender] : nontender [JVD] : no jugular venous distention  [Normal Breath Sounds] : Normal breath sounds [Normal Heart Sounds] : normal heart sounds [Normal Rate and Rhythm] : normal rate and rhythm [No Rash or Lesion] : No rash or lesion [Alert] : alert [Oriented to Person] : oriented to person [Oriented to Place] : oriented to place [Oriented to Time] : oriented to time [Calm] : calm [de-identified] : Looks well in no distress, of stated age. [de-identified] : Pupils equal reactive to light normocephalic atraumatic. [de-identified] : Moves all 4 extremities appropriately with 5 over 5 strength

## 2023-04-04 NOTE — ASSESSMENT
[FreeTextEntry1] : 63-year-old male with previous history of colon cancer with changes in bowel habits.  Recommend colonoscopy.  Recommend colonoscopy. Risks and benefits of colonoscopy have been discussed which include but not limited to bleeding, perforation, missing a cancer or polyp occurring 5%.  Recommend high fiber diet, Metamucil daily, sitz baths, stool softeners, pain medications p.r.n. cardiac clearance for colonoscopy

## 2023-04-06 ENCOUNTER — OFFICE (OUTPATIENT)
Dept: URBAN - METROPOLITAN AREA CLINIC 100 | Facility: CLINIC | Age: 63
Setting detail: OPHTHALMOLOGY
End: 2023-04-06
Payer: MEDICARE

## 2023-04-06 DIAGNOSIS — H02.831: ICD-10-CM

## 2023-04-06 DIAGNOSIS — H01.001: ICD-10-CM

## 2023-04-06 DIAGNOSIS — H01.004: ICD-10-CM

## 2023-04-06 DIAGNOSIS — H10.45: ICD-10-CM

## 2023-04-06 DIAGNOSIS — H16.223: ICD-10-CM

## 2023-04-06 DIAGNOSIS — H00.14: ICD-10-CM

## 2023-04-06 DIAGNOSIS — H02.834: ICD-10-CM

## 2023-04-06 DIAGNOSIS — H05.20: ICD-10-CM

## 2023-04-06 PROCEDURE — 92012 INTRM OPH EXAM EST PATIENT: CPT | Performed by: OPHTHALMOLOGY

## 2023-04-06 PROCEDURE — 92285 EXTERNAL OCULAR PHOTOGRAPHY: CPT | Performed by: OPHTHALMOLOGY

## 2023-04-06 ASSESSMENT — VISUAL ACUITY
OS_BCVA: 20/20-2
OD_BCVA: 20/20-1

## 2023-04-06 ASSESSMENT — REFRACTION_AUTOREFRACTION
OS_AXIS: 077
OD_SPHERE: +0.75
OS_SPHERE: +1.00
OS_CYLINDER: -1.50
OD_AXIS: 131
OD_CYLINDER: -0.25

## 2023-04-06 ASSESSMENT — KERATOMETRY
OD_AXISANGLE_DEGREES: 060
OS_K1POWER_DIOPTERS: 42.00
OS_AXISANGLE_DEGREES: 156
OD_K2POWER_DIOPTERS: 42.25
OD_K1POWER_DIOPTERS: 42.00
OS_K2POWER_DIOPTERS: 42.75

## 2023-04-06 ASSESSMENT — AXIALLENGTH_DERIVED
OS_AL: 23.912
OD_AL: 23.8564

## 2023-04-06 ASSESSMENT — SUPERFICIAL PUNCTATE KERATITIS (SPK)
OD_SPK: T
OS_SPK: T

## 2023-04-06 ASSESSMENT — LID POSITION - DERMATOCHALASIS
OD_DERMATOCHALASIS: RUL
OS_DERMATOCHALASIS: LUL

## 2023-04-06 ASSESSMENT — CONFRONTATIONAL VISUAL FIELD TEST (CVF)
OS_FINDINGS: FULL
OD_FINDINGS: FULL

## 2023-04-06 ASSESSMENT — SPHEQUIV_DERIVED
OD_SPHEQUIV: 0.625
OS_SPHEQUIV: 0.25

## 2023-04-06 ASSESSMENT — LID EXAM ASSESSMENTS
OS_BLEPHARITIS: LUL 1+
OD_BLEPHARITIS: RUL 1+

## 2023-05-31 ENCOUNTER — OFFICE (OUTPATIENT)
Dept: URBAN - METROPOLITAN AREA CLINIC 12 | Facility: CLINIC | Age: 63
Setting detail: OPHTHALMOLOGY
End: 2023-05-31
Payer: MEDICARE

## 2023-05-31 DIAGNOSIS — H16.223: ICD-10-CM

## 2023-05-31 DIAGNOSIS — H10.45: ICD-10-CM

## 2023-05-31 DIAGNOSIS — H01.004: ICD-10-CM

## 2023-05-31 DIAGNOSIS — H00.12: ICD-10-CM

## 2023-05-31 DIAGNOSIS — H01.001: ICD-10-CM

## 2023-05-31 PROCEDURE — 92012 INTRM OPH EXAM EST PATIENT: CPT | Performed by: OPTOMETRIST

## 2023-05-31 ASSESSMENT — SPHEQUIV_DERIVED
OD_SPHEQUIV: 0.625
OS_SPHEQUIV: 0.625

## 2023-05-31 ASSESSMENT — REFRACTION_AUTOREFRACTION
OS_SPHERE: +1.25
OS_AXIS: 076
OD_SPHERE: +0.75
OD_CYLINDER: -0.25
OS_CYLINDER: -1.25
OD_AXIS: 137

## 2023-05-31 ASSESSMENT — SUPERFICIAL PUNCTATE KERATITIS (SPK)
OS_SPK: T
OD_SPK: T

## 2023-05-31 ASSESSMENT — VISUAL ACUITY
OD_BCVA: 20/20-1
OS_BCVA: 20/25

## 2023-05-31 ASSESSMENT — LID POSITION - DERMATOCHALASIS
OD_DERMATOCHALASIS: RUL
OS_DERMATOCHALASIS: LUL

## 2023-05-31 ASSESSMENT — CONFRONTATIONAL VISUAL FIELD TEST (CVF)
OS_FINDINGS: FULL
OD_FINDINGS: FULL

## 2023-05-31 ASSESSMENT — LID EXAM ASSESSMENTS
OS_BLEPHARITIS: LUL 1+
OD_BLEPHARITIS: RUL 1+

## 2023-05-31 ASSESSMENT — TONOMETRY
OD_IOP_MMHG: 18
OS_IOP_MMHG: 19

## 2023-09-11 ENCOUNTER — NON-APPOINTMENT (OUTPATIENT)
Age: 63
End: 2023-09-11

## 2023-09-11 ENCOUNTER — APPOINTMENT (OUTPATIENT)
Dept: CARDIOLOGY | Facility: CLINIC | Age: 63
End: 2023-09-11
Payer: MEDICARE

## 2023-09-11 VITALS
DIASTOLIC BLOOD PRESSURE: 78 MMHG | HEIGHT: 71 IN | WEIGHT: 245 LBS | BODY MASS INDEX: 34.3 KG/M2 | HEART RATE: 88 BPM | OXYGEN SATURATION: 96 % | SYSTOLIC BLOOD PRESSURE: 128 MMHG

## 2023-09-11 DIAGNOSIS — Z01.810 ENCOUNTER FOR PREPROCEDURAL CARDIOVASCULAR EXAMINATION: ICD-10-CM

## 2023-09-11 PROCEDURE — 93000 ELECTROCARDIOGRAM COMPLETE: CPT

## 2023-09-11 PROCEDURE — 99214 OFFICE O/P EST MOD 30 MIN: CPT | Mod: 25

## 2023-09-21 ENCOUNTER — APPOINTMENT (OUTPATIENT)
Dept: CARDIOLOGY | Facility: CLINIC | Age: 63
End: 2023-09-21
Payer: MEDICARE

## 2023-09-21 PROCEDURE — 93306 TTE W/DOPPLER COMPLETE: CPT

## 2023-10-02 ENCOUNTER — NON-APPOINTMENT (OUTPATIENT)
Age: 63
End: 2023-10-02

## 2023-11-01 ENCOUNTER — APPOINTMENT (OUTPATIENT)
Dept: COLORECTAL SURGERY | Facility: AMBULATORY MEDICAL SERVICES | Age: 63
End: 2023-11-01
Payer: MEDICARE

## 2023-11-01 PROCEDURE — 45378 DIAGNOSTIC COLONOSCOPY: CPT

## 2023-11-06 ENCOUNTER — NON-APPOINTMENT (OUTPATIENT)
Age: 63
End: 2023-11-06

## 2023-11-06 ENCOUNTER — APPOINTMENT (OUTPATIENT)
Dept: CARDIOLOGY | Facility: CLINIC | Age: 63
End: 2023-11-06
Payer: MEDICARE

## 2023-11-06 VITALS
OXYGEN SATURATION: 98 % | SYSTOLIC BLOOD PRESSURE: 160 MMHG | DIASTOLIC BLOOD PRESSURE: 104 MMHG | BODY MASS INDEX: 34.3 KG/M2 | WEIGHT: 245 LBS | HEART RATE: 53 BPM | HEIGHT: 71 IN

## 2023-11-06 DIAGNOSIS — I42.9 CARDIOMYOPATHY, UNSPECIFIED: ICD-10-CM

## 2023-11-06 DIAGNOSIS — I50.20 UNSPECIFIED SYSTOLIC (CONGESTIVE) HEART FAILURE: ICD-10-CM

## 2023-11-06 DIAGNOSIS — I48.91 UNSPECIFIED ATRIAL FIBRILLATION: ICD-10-CM

## 2023-11-06 PROCEDURE — 99215 OFFICE O/P EST HI 40 MIN: CPT | Mod: 25

## 2023-11-06 PROCEDURE — 93000 ELECTROCARDIOGRAM COMPLETE: CPT

## 2023-11-06 RX ORDER — VITAMIN E ACID SUCCINATE 268 MG
TABLET ORAL DAILY
Refills: 0 | Status: ACTIVE | COMMUNITY

## 2023-11-06 RX ORDER — KRILL/OM-3/DHA/EPA/PHOSPHO/AST 1000-230MG
CAPSULE ORAL DAILY
Refills: 0 | Status: ACTIVE | COMMUNITY

## 2023-11-06 RX ORDER — PNV NO.95/FERROUS FUM/FOLIC AC 28MG-0.8MG
1000 TABLET ORAL DAILY
Refills: 0 | Status: ACTIVE | COMMUNITY

## 2023-11-16 NOTE — REASON FOR VISIT
Resume activity as tolerated   [FreeTextEntry1] : atrial fibrillation  [Follow-Up - Clinic] : a clinic follow-up of [FreeTextEntry2] : atrial fibrillation

## 2024-06-11 RX ORDER — CARVEDILOL 12.5 MG/1
12.5 TABLET, FILM COATED ORAL TWICE DAILY
Qty: 270 | Refills: 1 | Status: ACTIVE | COMMUNITY
Start: 2018-04-19 | End: 1900-01-01

## 2024-09-19 NOTE — DISCHARGE NOTE ADULT - MEDICATION SUMMARY - MEDICATIONS TO STOP TAKING
Most recent A1c:   Hemoglobin A1C (%)   Date Value   08/30/2024 6.1 (H)   Stable - 8/30/2023 was 6.1  Continue working on weight loss    You have pre-diabetic level blood sugar.  This means you are at risk for developing diabetes.  ;  I recommend you avoid processed carbohydrates and sugar containing foods/beverages.  If you must have a sweetened beverage, please use Stevia and avoid artificial sweeteners such as aspartame, sucralose, sweet n low, etc.;DRINK WATER!    A good diet plan to follow is the Mediterranean diet.  Use online search to read more about this.;    Another simple rule is to shop the grocery store perimeter -thus filling your cart with fresh fruits, vegetables, lean meats (fish, chicken); dairy, cheese, and nuts.  Avoid the isles where you find bagged and boxed items that are processed. (chips, crackers, cookies, candies, snacks, etc.);    Aerobic cardiovascular exercise 150min weekly is essential for good health, BP control, sugar metabolism, and weight management.;September 19, 2024   I will STOP taking the medications listed below when I get home from the hospital:  None